# Patient Record
Sex: FEMALE | Race: WHITE | Employment: OTHER | URBAN - NONMETROPOLITAN AREA
[De-identification: names, ages, dates, MRNs, and addresses within clinical notes are randomized per-mention and may not be internally consistent; named-entity substitution may affect disease eponyms.]

---

## 2017-04-26 ENCOUNTER — OFFICE VISIT (OUTPATIENT)
Dept: PULMONOLOGY | Age: 72
End: 2017-04-26

## 2017-04-26 VITALS
HEART RATE: 52 BPM | OXYGEN SATURATION: 97 % | SYSTOLIC BLOOD PRESSURE: 120 MMHG | HEIGHT: 65 IN | TEMPERATURE: 97.4 F | WEIGHT: 168.2 LBS | DIASTOLIC BLOOD PRESSURE: 86 MMHG | BODY MASS INDEX: 28.02 KG/M2

## 2017-04-26 DIAGNOSIS — K21.9 GASTROESOPHAGEAL REFLUX DISEASE WITHOUT ESOPHAGITIS: ICD-10-CM

## 2017-04-26 DIAGNOSIS — J47.9 BRONCHIECTASIS WITHOUT COMPLICATION (HCC): Primary | ICD-10-CM

## 2017-04-26 PROCEDURE — G8428 CUR MEDS NOT DOCUMENT: HCPCS | Performed by: INTERNAL MEDICINE

## 2017-04-26 PROCEDURE — 99213 OFFICE O/P EST LOW 20 MIN: CPT | Performed by: INTERNAL MEDICINE

## 2017-04-26 PROCEDURE — 3014F SCREEN MAMMO DOC REV: CPT | Performed by: INTERNAL MEDICINE

## 2017-04-26 PROCEDURE — 1123F ACP DISCUSS/DSCN MKR DOCD: CPT | Performed by: INTERNAL MEDICINE

## 2017-04-26 PROCEDURE — 4040F PNEUMOC VAC/ADMIN/RCVD: CPT | Performed by: INTERNAL MEDICINE

## 2017-04-26 PROCEDURE — 1090F PRES/ABSN URINE INCON ASSESS: CPT | Performed by: INTERNAL MEDICINE

## 2017-04-26 PROCEDURE — 1036F TOBACCO NON-USER: CPT | Performed by: INTERNAL MEDICINE

## 2017-04-26 PROCEDURE — G8420 CALC BMI NORM PARAMETERS: HCPCS | Performed by: INTERNAL MEDICINE

## 2017-04-26 PROCEDURE — 3017F COLORECTAL CA SCREEN DOC REV: CPT | Performed by: INTERNAL MEDICINE

## 2017-04-26 PROCEDURE — G8400 PT W/DXA NO RESULTS DOC: HCPCS | Performed by: INTERNAL MEDICINE

## 2017-04-26 RX ORDER — OMEPRAZOLE 20 MG/1
20 TABLET, DELAYED RELEASE ORAL DAILY
Qty: 90 TABLET | Refills: 3 | Status: SHIPPED | OUTPATIENT
Start: 2017-04-26 | End: 2020-07-02 | Stop reason: ALTCHOICE

## 2017-04-26 ASSESSMENT — ENCOUNTER SYMPTOMS
APNEA: 0
SHORTNESS OF BREATH: 0
WHEEZING: 0
COUGH: 0

## 2017-07-15 ENCOUNTER — HOSPITAL ENCOUNTER (OUTPATIENT)
Age: 72
Discharge: HOME | End: 2017-07-15
Payer: MEDICARE

## 2017-07-15 LAB
ANION GAP SERPL CALCULATED.3IONS-SCNC: 12 MEQ/L (ref 8–16)
BUN BLDV-MCNC: 14 MG/DL (ref 7–22)
CALCIUM SERPL-MCNC: 9.2 MG/DL (ref 8.5–10.5)
CHLORIDE BLD-SCNC: 104 MEQ/L (ref 98–111)
CO2: 24 MEQ/L (ref 23–33)
CREAT SERPL-MCNC: 0.7 MG/DL (ref 0.4–1.2)
EKG ATRIAL RATE: 54 BPM
EKG P AXIS: 36 DEGREES
EKG P-R INTERVAL: 168 MS
EKG Q-T INTERVAL: 472 MS
EKG QRS DURATION: 94 MS
EKG QTC CALCULATION (BAZETT): 447 MS
EKG R AXIS: 38 DEGREES
EKG T AXIS: 52 DEGREES
EKG VENTRICULAR RATE: 54 BPM
GFR SERPL CREATININE-BSD FRML MDRD: 82 ML/MIN/1.73M2
GLUCOSE BLD-MCNC: 100 MG/DL (ref 70–108)
HCT VFR BLD CALC: 43.2 % (ref 37–47)
HEMOGLOBIN: 14.2 GM/DL (ref 12–16)
MCH RBC QN AUTO: 29.8 PG (ref 27–31)
MCHC RBC AUTO-ENTMCNC: 32.8 GM/DL (ref 33–37)
MCV RBC AUTO: 90.9 FL (ref 81–99)
PDW BLD-RTO: 15.4 % (ref 11.5–14.5)
PLATELET # BLD: 121 THOU/MM3 (ref 130–400)
PMV BLD AUTO: 7.2 MCM (ref 7.4–10.4)
POTASSIUM SERPL-SCNC: 4.3 MEQ/L (ref 3.5–5.2)
RBC # BLD: 4.75 MILL/MM3 (ref 4.2–5.4)
SODIUM BLD-SCNC: 140 MEQ/L (ref 135–145)
WBC # BLD: 40.6 THOU/MM3 (ref 4.8–10.8)

## 2017-07-15 PROCEDURE — 36415 COLL VENOUS BLD VENIPUNCTURE: CPT

## 2017-07-15 PROCEDURE — 80048 BASIC METABOLIC PNL TOTAL CA: CPT

## 2017-07-15 PROCEDURE — 93005 ELECTROCARDIOGRAM TRACING: CPT

## 2017-07-15 PROCEDURE — 85027 COMPLETE CBC AUTOMATED: CPT

## 2017-07-21 RX ORDER — METOPROLOL TARTRATE 50 MG/1
50 TABLET, FILM COATED ORAL 2 TIMES DAILY
COMMUNITY
Start: 2017-04-13

## 2017-07-21 RX ORDER — AMLODIPINE BESYLATE 10 MG/1
10 TABLET ORAL DAILY
COMMUNITY
Start: 2017-04-13

## 2017-07-21 RX ORDER — FLUTICASONE PROPIONATE 220 UG/1
1 AEROSOL, METERED RESPIRATORY (INHALATION) 2 TIMES DAILY
COMMUNITY
End: 2017-08-14 | Stop reason: SDUPTHER

## 2017-07-28 ENCOUNTER — ANESTHESIA (OUTPATIENT)
Dept: OPERATING ROOM | Age: 72
End: 2017-07-28
Payer: MEDICARE

## 2017-07-28 ENCOUNTER — HOSPITAL ENCOUNTER (OUTPATIENT)
Age: 72
Setting detail: OUTPATIENT SURGERY
Discharge: HOME | End: 2017-07-28
Attending: SPECIALIST | Admitting: SPECIALIST
Payer: MEDICARE

## 2017-07-28 ENCOUNTER — ANESTHESIA EVENT (OUTPATIENT)
Dept: OPERATING ROOM | Age: 72
End: 2017-07-28
Payer: MEDICARE

## 2017-07-28 VITALS
HEART RATE: 60 BPM | OXYGEN SATURATION: 95 % | TEMPERATURE: 97.1 F | RESPIRATION RATE: 16 BRPM | DIASTOLIC BLOOD PRESSURE: 61 MMHG | WEIGHT: 169 LBS | BODY MASS INDEX: 28.16 KG/M2 | HEIGHT: 65 IN | SYSTOLIC BLOOD PRESSURE: 123 MMHG

## 2017-07-28 VITALS — SYSTOLIC BLOOD PRESSURE: 130 MMHG | DIASTOLIC BLOOD PRESSURE: 57 MMHG | OXYGEN SATURATION: 96 %

## 2017-07-28 PROCEDURE — 2580000003 HC RX 258: Performed by: SPECIALIST

## 2017-07-28 PROCEDURE — 3700000000 HC ANESTHESIA ATTENDED CARE: Performed by: SPECIALIST

## 2017-07-28 PROCEDURE — 3600000003 HC SURGERY LEVEL 3 BASE: Performed by: SPECIALIST

## 2017-07-28 PROCEDURE — 3700000001 HC ADD 15 MINUTES (ANESTHESIA): Performed by: SPECIALIST

## 2017-07-28 PROCEDURE — 7100000011 HC PHASE II RECOVERY - ADDTL 15 MIN: Performed by: SPECIALIST

## 2017-07-28 PROCEDURE — 7100000010 HC PHASE II RECOVERY - FIRST 15 MIN: Performed by: SPECIALIST

## 2017-07-28 PROCEDURE — 3600000013 HC SURGERY LEVEL 3 ADDTL 15MIN: Performed by: SPECIALIST

## 2017-07-28 PROCEDURE — 6370000000 HC RX 637 (ALT 250 FOR IP): Performed by: SPECIALIST

## 2017-07-28 PROCEDURE — 6360000002 HC RX W HCPCS: Performed by: SPECIALIST

## 2017-07-28 PROCEDURE — 2500000003 HC RX 250 WO HCPCS: Performed by: SPECIALIST

## 2017-07-28 RX ORDER — PROPOFOL 10 MG/ML
INJECTION, EMULSION INTRAVENOUS PRN
Status: DISCONTINUED | OUTPATIENT
Start: 2017-07-28 | End: 2017-07-28 | Stop reason: SDUPTHER

## 2017-07-28 RX ORDER — GLYCOPYRROLATE 0.2 MG/ML
INJECTION INTRAMUSCULAR; INTRAVENOUS PRN
Status: DISCONTINUED | OUTPATIENT
Start: 2017-07-28 | End: 2017-07-28 | Stop reason: SDUPTHER

## 2017-07-28 RX ORDER — ONDANSETRON 2 MG/ML
INJECTION INTRAMUSCULAR; INTRAVENOUS PRN
Status: DISCONTINUED | OUTPATIENT
Start: 2017-07-28 | End: 2017-07-28 | Stop reason: SDUPTHER

## 2017-07-28 RX ORDER — SODIUM CHLORIDE 9 MG/ML
INJECTION, SOLUTION INTRAVENOUS CONTINUOUS
Status: DISCONTINUED | OUTPATIENT
Start: 2017-07-28 | End: 2017-07-28 | Stop reason: HOSPADM

## 2017-07-28 RX ORDER — GINSENG 100 MG
CAPSULE ORAL PRN
Status: DISCONTINUED | OUTPATIENT
Start: 2017-07-28 | End: 2017-07-28 | Stop reason: HOSPADM

## 2017-07-28 RX ORDER — LIDOCAINE HYDROCHLORIDE AND EPINEPHRINE 10; 10 MG/ML; UG/ML
INJECTION, SOLUTION INFILTRATION; PERINEURAL PRN
Status: DISCONTINUED | OUTPATIENT
Start: 2017-07-28 | End: 2017-07-28 | Stop reason: HOSPADM

## 2017-07-28 RX ORDER — FENTANYL CITRATE 50 UG/ML
INJECTION, SOLUTION INTRAMUSCULAR; INTRAVENOUS PRN
Status: DISCONTINUED | OUTPATIENT
Start: 2017-07-28 | End: 2017-07-28 | Stop reason: SDUPTHER

## 2017-07-28 RX ADMIN — CEFAZOLIN SODIUM 1 G: 1 INJECTION, SOLUTION INTRAVENOUS at 08:12

## 2017-07-28 RX ADMIN — FENTANYL CITRATE 50 MCG: 50 INJECTION INTRAMUSCULAR; INTRAVENOUS at 08:12

## 2017-07-28 RX ADMIN — PROPOFOL 30 MG: 10 INJECTION, EMULSION INTRAVENOUS at 08:50

## 2017-07-28 RX ADMIN — PROPOFOL 20 MG: 10 INJECTION, EMULSION INTRAVENOUS at 09:10

## 2017-07-28 RX ADMIN — ONDANSETRON 4 MG: 2 INJECTION INTRAMUSCULAR; INTRAVENOUS at 08:26

## 2017-07-28 RX ADMIN — PROPOFOL 50 MG: 10 INJECTION, EMULSION INTRAVENOUS at 08:13

## 2017-07-28 RX ADMIN — PROPOFOL 30 MG: 10 INJECTION, EMULSION INTRAVENOUS at 09:00

## 2017-07-28 RX ADMIN — FENTANYL CITRATE 25 MCG: 50 INJECTION INTRAMUSCULAR; INTRAVENOUS at 08:59

## 2017-07-28 RX ADMIN — GLYCOPYRROLATE 0.1 MG: 0.2 INJECTION, SOLUTION INTRAMUSCULAR; INTRAVENOUS at 08:09

## 2017-07-28 RX ADMIN — PROPOFOL 20 MG: 10 INJECTION, EMULSION INTRAVENOUS at 08:30

## 2017-07-28 RX ADMIN — FENTANYL CITRATE 25 MCG: 50 INJECTION INTRAMUSCULAR; INTRAVENOUS at 08:53

## 2017-07-28 RX ADMIN — PROPOFOL 20 MG: 10 INJECTION, EMULSION INTRAVENOUS at 08:23

## 2017-07-28 RX ADMIN — LIDOCAINE HYDROCHLORIDE 50 MG: 20 INJECTION, SOLUTION INTRAVENOUS at 08:13

## 2017-07-28 RX ADMIN — PROPOFOL 30 MG: 10 INJECTION, EMULSION INTRAVENOUS at 08:40

## 2017-07-28 RX ADMIN — SODIUM CHLORIDE: 9 INJECTION, SOLUTION INTRAVENOUS at 08:09

## 2017-07-28 ASSESSMENT — PULMONARY FUNCTION TESTS
PIF_VALUE: 0

## 2017-07-28 ASSESSMENT — PAIN - FUNCTIONAL ASSESSMENT: PAIN_FUNCTIONAL_ASSESSMENT: 0-10

## 2017-07-28 ASSESSMENT — COPD QUESTIONNAIRES: CAT_SEVERITY: NO INTERVAL CHANGE

## 2017-07-28 ASSESSMENT — PAIN SCALES - GENERAL: PAINLEVEL_OUTOF10: 0

## 2017-08-14 RX ORDER — FLUTICASONE PROPIONATE 220 UG/1
1 AEROSOL, METERED RESPIRATORY (INHALATION) 2 TIMES DAILY
Qty: 3 INHALER | Refills: 3 | Status: SHIPPED | OUTPATIENT
Start: 2017-08-14 | End: 2022-10-18

## 2017-10-06 ENCOUNTER — HOSPITAL ENCOUNTER (OUTPATIENT)
Age: 72
Setting detail: OUTPATIENT SURGERY
Discharge: HOME | End: 2017-10-06
Attending: SPECIALIST | Admitting: SPECIALIST
Payer: MEDICARE

## 2017-10-06 ENCOUNTER — ANESTHESIA (OUTPATIENT)
Dept: OPERATING ROOM | Age: 72
End: 2017-10-06
Payer: MEDICARE

## 2017-10-06 ENCOUNTER — ANESTHESIA EVENT (OUTPATIENT)
Dept: OPERATING ROOM | Age: 72
End: 2017-10-06
Payer: MEDICARE

## 2017-10-06 VITALS
OXYGEN SATURATION: 95 % | HEART RATE: 58 BPM | RESPIRATION RATE: 13 BRPM | HEIGHT: 65 IN | BODY MASS INDEX: 27.99 KG/M2 | DIASTOLIC BLOOD PRESSURE: 57 MMHG | SYSTOLIC BLOOD PRESSURE: 130 MMHG | TEMPERATURE: 97.1 F | WEIGHT: 168 LBS

## 2017-10-06 VITALS — SYSTOLIC BLOOD PRESSURE: 120 MMHG | OXYGEN SATURATION: 96 % | DIASTOLIC BLOOD PRESSURE: 50 MMHG

## 2017-10-06 PROCEDURE — 7100000011 HC PHASE II RECOVERY - ADDTL 15 MIN: Performed by: SPECIALIST

## 2017-10-06 PROCEDURE — 6360000002 HC RX W HCPCS: Performed by: SPECIALIST

## 2017-10-06 PROCEDURE — 2500000003 HC RX 250 WO HCPCS: Performed by: NURSE ANESTHETIST, CERTIFIED REGISTERED

## 2017-10-06 PROCEDURE — 3600000012 HC SURGERY LEVEL 2 ADDTL 15MIN: Performed by: SPECIALIST

## 2017-10-06 PROCEDURE — 2580000003 HC RX 258: Performed by: SPECIALIST

## 2017-10-06 PROCEDURE — 6360000002 HC RX W HCPCS: Performed by: NURSE ANESTHETIST, CERTIFIED REGISTERED

## 2017-10-06 PROCEDURE — 2500000003 HC RX 250 WO HCPCS: Performed by: SPECIALIST

## 2017-10-06 PROCEDURE — 3600000002 HC SURGERY LEVEL 2 BASE: Performed by: SPECIALIST

## 2017-10-06 PROCEDURE — 3700000001 HC ADD 15 MINUTES (ANESTHESIA): Performed by: SPECIALIST

## 2017-10-06 PROCEDURE — 7100000010 HC PHASE II RECOVERY - FIRST 15 MIN: Performed by: SPECIALIST

## 2017-10-06 PROCEDURE — 3700000000 HC ANESTHESIA ATTENDED CARE: Performed by: SPECIALIST

## 2017-10-06 RX ORDER — FENTANYL CITRATE 50 UG/ML
INJECTION, SOLUTION INTRAMUSCULAR; INTRAVENOUS PRN
Status: DISCONTINUED | OUTPATIENT
Start: 2017-10-06 | End: 2017-10-06 | Stop reason: SDUPTHER

## 2017-10-06 RX ORDER — SODIUM CHLORIDE 9 MG/ML
INJECTION, SOLUTION INTRAVENOUS CONTINUOUS
Status: DISCONTINUED | OUTPATIENT
Start: 2017-10-06 | End: 2017-10-06 | Stop reason: HOSPADM

## 2017-10-06 RX ORDER — PROPOFOL 10 MG/ML
INJECTION, EMULSION INTRAVENOUS PRN
Status: DISCONTINUED | OUTPATIENT
Start: 2017-10-06 | End: 2017-10-06 | Stop reason: SDUPTHER

## 2017-10-06 RX ORDER — ONDANSETRON 2 MG/ML
INJECTION INTRAMUSCULAR; INTRAVENOUS PRN
Status: DISCONTINUED | OUTPATIENT
Start: 2017-10-06 | End: 2017-10-06 | Stop reason: SDUPTHER

## 2017-10-06 RX ORDER — LIDOCAINE HYDROCHLORIDE AND EPINEPHRINE 10; 10 MG/ML; UG/ML
INJECTION, SOLUTION INFILTRATION; PERINEURAL PRN
Status: DISCONTINUED | OUTPATIENT
Start: 2017-10-06 | End: 2017-10-06 | Stop reason: HOSPADM

## 2017-10-06 RX ORDER — LIDOCAINE HYDROCHLORIDE 20 MG/ML
INJECTION, SOLUTION INFILTRATION; PERINEURAL PRN
Status: DISCONTINUED | OUTPATIENT
Start: 2017-10-06 | End: 2017-10-06 | Stop reason: SDUPTHER

## 2017-10-06 RX ADMIN — PROPOFOL 20 MG: 10 INJECTION, EMULSION INTRAVENOUS at 10:23

## 2017-10-06 RX ADMIN — PROPOFOL 30 MG: 10 INJECTION, EMULSION INTRAVENOUS at 10:15

## 2017-10-06 RX ADMIN — ONDANSETRON 4 MG: 2 INJECTION INTRAMUSCULAR; INTRAVENOUS at 10:28

## 2017-10-06 RX ADMIN — CEFAZOLIN SODIUM 1 G: 1 INJECTION, SOLUTION INTRAVENOUS at 10:16

## 2017-10-06 RX ADMIN — PROPOFOL 20 MG: 10 INJECTION, EMULSION INTRAVENOUS at 10:25

## 2017-10-06 RX ADMIN — FENTANYL CITRATE 25 MCG: 50 INJECTION INTRAMUSCULAR; INTRAVENOUS at 10:15

## 2017-10-06 RX ADMIN — SODIUM CHLORIDE: 9 INJECTION, SOLUTION INTRAVENOUS at 10:14

## 2017-10-06 RX ADMIN — LIDOCAINE HYDROCHLORIDE 40 MG: 20 INJECTION, SOLUTION INFILTRATION; PERINEURAL at 10:15

## 2017-10-06 RX ADMIN — FENTANYL CITRATE 25 MCG: 50 INJECTION INTRAMUSCULAR; INTRAVENOUS at 10:30

## 2017-10-06 RX ADMIN — PROPOFOL 20 MG: 10 INJECTION, EMULSION INTRAVENOUS at 10:19

## 2017-10-06 ASSESSMENT — PULMONARY FUNCTION TESTS
PIF_VALUE: 0
PIF_VALUE: 1
PIF_VALUE: 0

## 2017-10-06 ASSESSMENT — PAIN - FUNCTIONAL ASSESSMENT: PAIN_FUNCTIONAL_ASSESSMENT: 0-10

## 2017-10-06 NOTE — ANESTHESIA PRE PROCEDURE
lymphocytic leukemia) (HCC) C91.10    Hypertrophy of nasal turbinates J34.3    Post-nasal drainage R09.82    Chronic rhinitis J31.0    Deviated nasal septum J34.2    Maxillary antritis J32.0    Ethmoid sinusitis J32.2    GERD (gastroesophageal reflux disease) K21.9    Acute sinusitis J01.90    Immune deficiency disorder (HCC) D84.9       Past Medical History:        Diagnosis Date    Chronic bronchitis (HCC)     Chronic lymphocytic leukemia (HCC)     Essential hypertension     GERD (gastroesophageal reflux disease)     Pneumonia     Thyroid disease     Vein, varicose        Past Surgical History:        Procedure Laterality Date    COLONOSCOPY      x3    EYE SURGERY      Eye lid.     EYE SURGERY Bilateral 07/2016    Cataracts    HAND SURGERY      burnt off mole on hand for precancerous    MANDIBLE SURGERY      burnt off mole for precancerous     MOHS SURGERY Left 07/28/2017    repair SCC of the left lateral brow    MOHS SURGERY  10/05/2017    Nose    MA DELAY/SECTN FLAP LID,NOS,EAR,LIP Left 7/28/2017    MOHS REPAIR SCC OF THE LEFT LATERAL BROW performed by Rodrick De Anda MD at [address] [place] [address]  4/20/15    face       Social History:    Social History   Substance Use Topics    Smoking status: Never Smoker    Smokeless tobacco: Never Used    Alcohol use Yes      Comment: occasionally                                Counseling given: Not Answered      Vital Signs (Current):   Vitals:    10/02/17 0903 10/06/17 0931   BP:  (!) 153/61   Pulse:  54   Resp:  16   Temp:  97.9 °F (36.6 °C)   TempSrc:  Temporal   SpO2:  96%   Weight: 160 lb (72.6 kg) 168 lb (76.2 kg)   Height: 5' 5\" (1.651 m) 5' 5\" (1.651 m)                                              BP Readings from Last 3 Encounters:   10/06/17 (!) 153/61   07/28/17 123/61   07/28/17 (!) 130/57       NPO Status: Time of last liquid consumption: 2330                        Time of last solid consumption: 2100 Date of last liquid consumption: 10/05/17                        Date of last solid food consumption: 10/05/17    BMI:   Wt Readings from Last 3 Encounters:   10/06/17 168 lb (76.2 kg)   07/28/17 169 lb (76.7 kg)   04/26/17 168 lb 3.2 oz (76.3 kg)     Body mass index is 27.96 kg/(m^2). CBC:   Lab Results   Component Value Date    WBC 40.6 07/15/2017    RBC 4.75 07/15/2017    HGB 14.2 07/15/2017    HCT 43.2 07/15/2017    MCV 90.9 07/15/2017    RDW 15.4 07/15/2017     07/15/2017       CMP:   Lab Results   Component Value Date     07/15/2017    K 4.3 07/15/2017     07/15/2017    CO2 24 07/15/2017    BUN 14 07/15/2017    CREATININE 0.7 07/15/2017    LABGLOM 82 07/15/2017    GLUCOSE 100 07/15/2017    CALCIUM 9.2 07/15/2017       POC Tests: No results for input(s): POCGLU, POCNA, POCK, POCCL, POCBUN, POCHEMO, POCHCT in the last 72 hours. Coags: No results found for: PROTIME, INR, APTT    HCG (If Applicable): No results found for: PREGTESTUR, PREGSERUM, HCG, HCGQUANT     ABGs: No results found for: PHART, PO2ART, OSD1REC, IOA1AXT, BEART, W3NUFGPT     Type & Screen (If Applicable):  No results found for: LABABO, LABRH    Anesthesia Evaluation    Airway: Mallampati: II  TM distance: >3 FB   Neck ROM: full  Mouth opening: > = 3 FB Dental:          Pulmonary: breath sounds clear to auscultation         Cardiovascular:    (+) hypertension:,         Rhythm: regular                   Neuro/Psych:      GI/Hepatic/Renal:   (+) GERD:,         Endo/Other:          Abdominal:                    Anesthesia Plan    ASA 3     MAC   (Possible GA)  intravenous induction   Anesthetic plan and risks discussed with patient. Plan discussed with CRNA.             Merrick Charles MD   10/6/2017

## 2017-10-06 NOTE — IP AVS SNAPSHOT
Patient Information     Patient Name MAEVE Wilhelm 1945      SEDATION/ANALGESIA INFORMATION/HOME GOING ADVICE     SEDATION / ANALGESIA INFORMATION / Fernando Garcia 83 have received the sedation/analgesia medication during your visit    Sedation/analgesia is used during short medical procedures under controlled supervision. The medication will produce a strong relaxation. You will be able to hear, speak and follow instructions, but your memory and alertness will be decreased. You will be able to swallow and breathe on your own. During sedation/analgesia your blood pressure, heart and breathing will be watched closely. After the procedure, you may not remember what was said or done. You may have the following effects from the medication. \" Drowsiness, dizziness, sleepiness or confusion. \" Difficulty remembering or delayed reaction times. \" Loss of fine muscle control or difficulty with your balance especially while walking. \" Difficulty focusing or blurred vision. You may not be aware of slight changes in your behavior and/or your reaction time because of the medication used during the procedure. Therefore you should follow these instructions. \" Have someone responsible help you with your care. \" Do not drive for 24 hours. \" Do not operate equipment for 24 hours (lawnmowers, power tools, kitchen accessories, stove). \" Do not drink any alcoholic beverages for a minimum of 24 hours. \" Do not make important personal, legal or business decisions for 24 hours. \" You may experience dizziness or lightheadedness. Move slowly and carefully, do not make sudden position changes. \" Drink extra amounts of fluids today. \" Increase your diet as tolerated (unless you have received specific instructions from your doctor). \" If you feel nauseated, continue with liquids until the nausea is gone. \" Notify your physician if you have not urinated within 8 hours after the procedure.

## 2017-10-06 NOTE — H&P
[address] [address]  History and Physical Update    Pt Name: Sherrlyn Cushing  MRN: [id number]  YOB: 1945  Date of evaluation: 10/6/2017    I have examined the patient and reviewed the H&P/Consult and there are no changes to the patient or plans.       Alessandro Varghese  Electronically signed 10/6/2017 at 7:28 AM

## 2017-10-06 NOTE — IP AVS SNAPSHOT
Pneumococcal 13-valent Conjugate Victor Hugo Lagos) 10/15/2015 -- -- --    External: Patient reported      Last Vitals          Most Recent Value    Temp  97.1 °F (36.2 °C)    Pulse  58    Resp  13    BP  (!)  130/57         After Visit Summary    This summary was created for you. Thank you for entrusting your care to us. The following information includes details about your hospital/visit stay along with steps you should take to help with your recovery once you leave the hospital.  In this packet, you will find information about the topics listed below:    · Instructions about your medications including a list of your home medications  · A summary of your hospital visit  · Follow-up appointments once you have left the hospital  · Your care plan at home      You may receive a survey regarding the care you received during your stay. Your input is valuable to us. We encourage you to complete and return your survey in the envelope provided. We hope you will choose us in the future for your healthcare needs. Patient Information     Patient Name MAEVE Raza 1945      Care Provided at:     Name Address Phone       [address] [phone]            Your Visit    Here you will find information about your visit, including the reason for your visit. Please take this sheet with you when you visit your doctor or other health care provider in the future. It will help determine the best possible medical care for you at that time. If you have any questions once you leave the hospital, please call the department phone number listed below. Why you were here     Your primary diagnosis was:  Not on File      Visit Information     Date & Time Provider Department Dept.  Phone    10/6/2017 MD Roma Thomas [phone]       Follow-up Appointments Care Plan Once You Return Home    This section includes instructions you will need to follow once you leave the hospital.  Your care team will discuss these with you, so you and those caring for you know how to best care for your health needs at home. This section may also include educational information about certain health topics that may be of help to you. Discharge Instructions       POST OPERATIVE INSTRUCTION SHEET  SKIN TUMOR/LESION REMOVAL          Activity:    · No strenuous activity for 48 hours  · No activity that stresses the suture closure/incision  · Regular diet; Unless operation of lip- then clear liquids for 48 hours (Sip from a cup: do not use a straw)  · ABSOLUTELY NO NICOTINE OF ANY TYPE    Wound Care:  · If Dermabond used, you may shower 24 hours post operation, but do not scrub, rub or pick at adhesive glue  · Keep all incisions clean  · You may shower 36 hours after the operation    Limitations:  · No swimming, hot tub, sauna or soaking in a bathtub    Prescriptions:  · Take exactly as prescribed    Follow-Up:  · Call office for an appointment in 3-4 weeks, unless otherwise instructed by Dr. Pam Duval our office if you experience any of the following:   Develop a fever (temperature is greater than 100.5F)   Develop redness greater than 1 cm around incision or red streaks up         extremity   Have any excess bleeding/ increased drainage or swelling at the             incision site    *Note:  Your pathology results will be reviewed with you at your scheduled follow-up appointment. [employer] Signup     Our records indicate that you have an active [employer] account. You can view your After Visit Summary by going to [email or web address] and logging in with your [employer] username and password.       If you don't have a [employer] username and password but a parent or guardian has access to your record, the parent or guardian should login

## 2020-07-02 ENCOUNTER — HOSPITAL ENCOUNTER (OUTPATIENT)
Dept: WOUND CARE | Age: 75
Discharge: HOME | End: 2020-07-02
Payer: MEDICARE

## 2020-07-02 VITALS
DIASTOLIC BLOOD PRESSURE: 69 MMHG | OXYGEN SATURATION: 96 % | HEIGHT: 65 IN | WEIGHT: 140 LBS | BODY MASS INDEX: 23.32 KG/M2 | SYSTOLIC BLOOD PRESSURE: 159 MMHG | TEMPERATURE: 97.4 F | RESPIRATION RATE: 16 BRPM | HEART RATE: 49 BPM

## 2020-07-02 PROBLEM — Z85.828 HISTORY OF MOHS SURGERY FOR SQUAMOUS CELL CARCINOMA OF SKIN: Status: ACTIVE | Noted: 2020-07-02

## 2020-07-02 PROBLEM — L98.499 SKIN ULCER DUE TO RADIATION EXPOSURE (HCC): Status: ACTIVE | Noted: 2020-07-02

## 2020-07-02 PROBLEM — Z98.890 HISTORY OF MOHS SURGERY FOR SQUAMOUS CELL CARCINOMA OF SKIN: Status: ACTIVE | Noted: 2020-07-02

## 2020-07-02 PROBLEM — Z85.828 PERSONAL HISTORY OF SQUAMOUS CELL CARCINOMA OF SKIN: Status: ACTIVE | Noted: 2020-07-02

## 2020-07-02 PROCEDURE — 99203 OFFICE O/P NEW LOW 30 MIN: CPT | Performed by: NURSE PRACTITIONER

## 2020-07-02 PROCEDURE — 99213 OFFICE O/P EST LOW 20 MIN: CPT

## 2020-07-02 RX ORDER — ALLOPURINOL 300 MG/1
300 TABLET ORAL DAILY
COMMUNITY
End: 2022-10-18

## 2020-07-02 RX ORDER — CLONIDINE HYDROCHLORIDE 0.1 MG/1
0.1 TABLET ORAL NIGHTLY
COMMUNITY

## 2020-07-02 RX ORDER — FUROSEMIDE 20 MG/1
20 TABLET ORAL 2 TIMES DAILY
COMMUNITY
End: 2022-10-18 | Stop reason: DRUGHIGH

## 2020-07-02 RX ORDER — IBRUTINIB 420 MG/1
420 TABLET, FILM COATED ORAL DAILY
COMMUNITY

## 2020-07-02 RX ORDER — FLUTICASONE PROPIONATE 50 MCG
1 SPRAY, SUSPENSION (ML) NASAL DAILY
COMMUNITY
End: 2022-10-18

## 2020-07-02 NOTE — PROGRESS NOTES
[address] [place] [address] and Procedure Note      Chad INGRAM [facility]  MEDICAL RECORD NUMBER:  [id number]  AGE: 66 y.o. GENDER: female  : 1945  EPISODE DATE:  2020    SUBJECTIVE:     Chief Complaint   Patient presents with    New Patient    Wound Check     SCALP         HISTORY OF PRESENT ILLNESS      Ritchie De La Garza is a 66 y.o. female who presents today for wound/ulcer evaluation. Chelsey Krishna is seen today for progressively worsening wound to left temporal area. Chelsey Krishna has a history of CLL for which she is undergoing chemotherapy with Dr. Rosamaria Chavez. 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 Zi Colindres in 2017. She has received radiation x2 to this area. Chad Abdullahi reports that Dr. Catalina Mckay has been following lesion to left temporal region and recently performed biopsy. Chad Abdullahi states that biopsy showed re-occurrence of cancerous tissue but those results are not available for review. MRI was completed on 6/3/2020 the results of which showed high suspicion for recurrence/spread of tumor. Chelsey Krishna states that follow up with Dr. Rosamaria Chavez is scheduled to determine plan as to treatment options. Chelsey Krishna states that wound to her left temporal region has been present for some time but has been increasing in size and drainage over the last month. She has been applying antibiotic ointment to the area and covering with a band-aid or clean gauze on a daily basis. She denies any foul smell to area. Denies fever, chills, nausea, shortness of breath, chest pain.       PAST MEDICAL HISTORY             Diagnosis Date    Chronic bronchitis (HCC)     Chronic lymphocytic leukemia (HCC)     CLL (chronic lymphocytic leukemia) (HCC)     Essential hypertension     GERD (gastroesophageal reflux disease)     Pneumonia     Squamous cell carcinoma of face     Thyroid disease     Vein, varicose        PAST SURGICAL HISTORY     Past Surgical History:   Procedure Laterality Date    CATARACT REMOVAL      COLONOSCOPY      x3    EYE LID SURGERY      EYE SURGERY      Eye lid.     EYE SURGERY Bilateral 2016    Cataracts    HAND SURGERY      burnt off mole on hand for precancerous    MANDIBLE SURGERY      burnt off mole for precancerous     MOHS SURGERY Left 2017    repair SCC of the left lateral brow    MOHS SURGERY  10/05/2017    Nose    MOHS SURGERY Right 10/06/2017    MOHS Defect Repair BCC Right Distal Nose     ND DELAY/SECTN FLAP LID,NOS,EAR,LIP Left 2017    MOHS REPAIR SCC OF THE LEFT LATERAL BROW performed by Beryl Ingram MD at [address] Right 10/6/2017    MOHS DEFECT REPAIR BCC RIGHT DISTAL NOSE performed by Beryl Ingram MD at [facility] SKIN BIOPSY  4/20/15    face       FAMILY HISTORY     Family History   Problem Relation Age of Onset    Emphysema Father     Heart Attack Father     Allergies Mother     Cancer Mother          of colon cancer at 80years old   [facility] Cancer Sister          of breast cancer age 48years old       SOCIAL HISTORY     Social History     Tobacco Use    Smoking status: Never Smoker    Smokeless tobacco: Never Used   Substance Use Topics    Alcohol use: Yes     Comment: occasionally    Drug use: No       ALLERGIES     Allergies   Allergen Reactions    Seasonal        MEDICATIONS     Current Outpatient Medications on File Prior to Encounter   Medication Sig Dispense Refill    furosemide (LASIX) 20 MG tablet Take 20 mg by mouth 2 times daily      allopurinol (ZYLOPRIM) 300 MG tablet Take 300 mg by mouth daily      ibrutinib (IMBRUVICA) 420 MG tablet Take 420 mg by mouth daily      cloNIDine (CATAPRES) 0.1 MG tablet Take 0.1 mg by mouth 2 times daily      Polyethylene Glycol 400 (BLINK TEARS OP) Apply to eye      fluticasone (FLONASE) 50 MCG/ACT nasal spray 1 spray by Each Nostril route daily      carboxymethylcellulose 1 % ophthalmic solution 1 drop 3 times daily      cemiplimab-rwlc (LIBTAYO) 350 MG/7ML SOLN chemo injection Infuse 350 mg intravenously every 21 days      fluticasone (FLOVENT HFA) 220 MCG/ACT inhaler Inhale 1 puff into the lungs 2 times daily 3 Inhaler 3    amLODIPine (NORVASC) 10 MG tablet Take 10 mg by mouth daily       metoprolol tartrate (LOPRESSOR) 50 MG tablet Take 50 mg by mouth 2 times daily       levothyroxine (SYNTHROID) 50 MCG tablet Take 0.5 mcg by mouth Daily.  Multiple Vitamins-Minerals (CENTRUM ULTRA WOMENS PO) Take 1 tablet by mouth daily. No current facility-administered medications on file prior to encounter. REVIEW OF SYSTEMS:     Constitutional: Denies fever, chills, night sweats, fatigue, weight loss/gain, loss of appetite   Head: Denies headache, head injury  Eye: Denies visual changes  Ears: Denies hearing loss, tinnitus  Mouth/throat: Denies ulceration, dental caries , dysphagia  Respiratory: Denies shortness of breath, cough, wheezing   Cardiovascular:Denies chest pain, palpitations, edema  Gastrointestinal: Denies nausea, vomiting, constipation, diarrhea, abdominal pain   Musculoskeletal: Denies joint pain, swelling , stiffness,  Endocrine: Denies polyuria, polydipsia, cold or heat intolerance  Hematology: Denies easy brusing or bleeding, hx of clotting disorder  Dermatology: +wound left temporal region Denies skin rash, eczema, pruritis  Psychiatry: Denies depression, anxiety, suicidal ideation    PHYSICAL EXAM:     BP (!) 159/69   Pulse (!) 49   Temp 97.4 °F (36.3 °C) (Tympanic)   Resp 16   Ht 5' 5\" (1.651 m)   Wt 140 lb (63.5 kg)   SpO2 96%   BMI 23.30 kg/m²   Wt Readings from Last 3 Encounters:   07/02/20 140 lb (63.5 kg)   10/06/17 168 lb (76.2 kg)   07/28/17 169 lb (76.7 kg)       General:  Awake, alert, no apparent distress. Appears stated age  [de-identified]: conjuctivae are clear without exudate or hemorrhage, anicteric sclera, moist oral mucosa.   Chest: Respirations regular, non-labored. Chest rise and fall equal bilaterally. Lungs clear to auscultation throughout all fields  Cardiovascular:  RRR,S1S2, no murmur or gallop. Abdomen:  Soft, non tender to palpation. Extremities: non-traumatic in appearance. Skin:  Warm and dry. Ulcerous lesion to left temporal region. Wound bed pink, moist with moderate amount of serosanguinous drainage present. Non tender to palpation. Desiree-wound pink, dry, thickening of skin related to radiation history to temporal area. Neurological: Awake, alert, oriented x4. Left sided facial droop-chronic per patient report related to facial nerve damage s/p radiation/surgery  Psychiatric:  Appropriate mood and affect    Wound 07/02/20 Face Left;Upper 1 (Active)   Wound Image   7/2/2020 10:03 AM   Wound Other 7/2/2020 10:03 AM   Offloading for Diabetic Foot Ulcers No offloading required 7/2/2020 10:03 AM   Dressing Status Changed; Intact; Old drainage 7/2/2020 10:03 AM   Dressing Changed Changed/New 7/2/2020 10:03 AM   Wound Cleansed Rinsed/Irrigated with saline 7/2/2020 10:03 AM   Wound Length (cm) 2.9 cm 7/2/2020 10:03 AM   Wound Width (cm) 2 cm 7/2/2020 10:03 AM   Wound Depth (cm) 0.3 cm 7/2/2020 10:03 AM   Wound Surface Area (cm^2) 5.8 cm^2 7/2/2020 10:03 AM   Wound Volume (cm^3) 1.74 cm^3 7/2/2020 10:03 AM   Wound Assessment Yellow;Pink 7/2/2020 10:03 AM   Drainage Amount Moderate 7/2/2020 10:03 AM   Drainage Description Serosanguinous 7/2/2020 10:03 AM   Odor None 7/2/2020 10:03 AM   Margins Attached edges 7/2/2020 10:03 AM   Desiree-wound Assessment Dry 7/2/2020 10:03 AM   Wittmann%Wound Bed 90 7/2/2020 10:03 AM   Yellow%Wound Bed 10 7/2/2020 10:03 AM   Number of days: 0         LABS     No results found for: BC    ASSESSMENT   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 Results unavailable for confirmation at this time.   History of Mohs surgery in 2017 per Dr. Hoover Dakins for similar lesion in same area as reported by patient. 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 Healing will be difficult related to radiation history, surgical history, ongoing chemotherapy and possible reoccurrence of cancerous tissue as reported in biopsy and MRI. Discussed treatment goal of infection prevention, controlling drainage, and limiting the increase in size that she has experienced over the last month. Further treatment for cancerous process per Dr. Madeline Mayer. Faviola Jazz verbalized understanding of goals of treatment and was agreeable to treatment plan as below. Left temporal wound- Apply silver alginate to wound. Cover with silicone bordered foam. Change three times weekly. Discontinue use of antibiotic ointment. Ok to wash hair three times a week with dressing changes. Leave old dressing on while showering. When finished proceed with dressing change as above. Keep wound clean and dry. Antibiotics: No    Follow up 3-4 weeks   Call with any questions or concerns prior to next visit. All questions and concerns addressed prior to discharge from today's visit. Please see attached Discharge Instructions    Written patient dismissal instructions given to patient and signed by patient or POA.

## 2020-07-02 NOTE — PLAN OF CARE
Problem: Wound:  Goal: Will show signs of wound healing; wound closure and no evidence of infection  Description: Will show signs of wound healing; wound closure and no evidence of infection  Outcome: Ongoing     Patient presents to wound clinic for left face wound. No s/s of infection noted. See AVS for discharge instructions. Follow up visit: 3 weeks on Thursday July 23rd at 9:30 am     Care plan reviewed with patient. Patient verbalizes understanding of the plan of care and contribute to goal setting.

## 2020-07-23 ENCOUNTER — HOSPITAL ENCOUNTER (OUTPATIENT)
Dept: WOUND CARE | Age: 75
Discharge: HOME | End: 2020-07-23
Payer: MEDICARE

## 2020-07-23 VITALS
RESPIRATION RATE: 16 BRPM | DIASTOLIC BLOOD PRESSURE: 65 MMHG | TEMPERATURE: 97.1 F | SYSTOLIC BLOOD PRESSURE: 140 MMHG | HEART RATE: 50 BPM | OXYGEN SATURATION: 98 %

## 2020-07-23 PROCEDURE — 99213 OFFICE O/P EST LOW 20 MIN: CPT

## 2020-07-23 PROCEDURE — 99213 OFFICE O/P EST LOW 20 MIN: CPT | Performed by: NURSE PRACTITIONER

## 2020-07-23 RX ORDER — FAMCICLOVIR 500 MG/1
500 TABLET, FILM COATED ORAL 3 TIMES DAILY
COMMUNITY
End: 2022-10-18

## 2020-07-23 NOTE — PLAN OF CARE
Problem: Wound:  Goal: Will show signs of wound healing; wound closure and no evidence of infection  Description: Will show signs of wound healing; wound closure and no evidence of infection  Outcome: Ongoing   Pt. Seen today for scalp wound see AVS for new orders. Follow up in 2 weeks. Care plan reviewed with patient. Patient verbalize understanding of the plan of care and contribute to goal setting.

## 2020-07-23 NOTE — PROGRESS NOTES
[address] [place] [address] and Procedure Note      Asha INGRAM [facility]  MEDICAL RECORD NUMBER:  [id number]  AGE: 66 y.o. GENDER: female  : 1945  EPISODE DATE:  2020    SUBJECTIVE:     Chief Complaint   Patient presents with    Wound Check     left face         HISTORY OF PRESENT ILLNESS      Karol Mireles is a 66 y.o. female who presents today for left temporal wound re-evaluation. Genie Ramirez has a history of CLL for which she is undergoing chemotherapy with Dr. Nora Nye. 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 Mckenzie Ferrell in 2017. She has received radiation x2 to this area. Asha Aleman reports that Dr. Ashu Ramirez has been following lesion to left temporal region and recently performed biopsy. Asha Aleman states that biopsy showed re-occurrence of cancerous tissue but those results are not available for review. MRI was completed on 6/3/2020 the results of which showed high suspicion for recurrence/spread of tumor. Genie Ramirez states that Dr. Nora Nye ordered repeat MRI of area at her last visit. She reports recent occurrence of shingles for which she was treated. She has been using foam dressings as ordered with silver alginate. She reports concerns that alginate has been sticking to the wound, causing wound to dry out. She reports scant drainage from wound. Denies fever, chills, shortness of breath, chest pain. Reports a good appetite. PAST MEDICAL HISTORY             Diagnosis Date    Chronic bronchitis (HCC)     Chronic lymphocytic leukemia (HCC)     CLL (chronic lymphocytic leukemia) (HCC)     Essential hypertension     GERD (gastroesophageal reflux disease)     Pneumonia     Squamous cell carcinoma of face     Thyroid disease     Vein, varicose        PAST SURGICAL HISTORY     Past Surgical History:   Procedure Laterality Date    CATARACT REMOVAL      COLONOSCOPY      x3    EYE LID SURGERY      EYE SURGERY      Eye lid.     EYE SURGERY Bilateral 2016    Cataracts    HAND SURGERY      burnt off mole on hand for precancerous    MANDIBLE SURGERY      burnt off mole for precancerous     MOHS SURGERY Left 2017    repair SCC of the left lateral brow    MOHS SURGERY  10/05/2017    Nose    MOHS SURGERY Right 10/06/2017    MOHS Defect Repair BCC Right Distal Nose     AK DELAY/SECTN FLAP LID,NOS,EAR,LIP Left 2017    MOHS REPAIR SCC OF THE LEFT LATERAL BROW performed by Kathleen Vázquez MD at [facility] Right 10/6/2017    MOHS DEFECT REPAIR BCC RIGHT DISTAL NOSE performed by Kathleen Vázquez MD at [facility] SKIN BIOPSY  4/20/15    face       FAMILY HISTORY     Family History   Problem Relation Age of Onset    Emphysema Father     Heart Attack Father     Allergies Mother     Cancer Mother          of colon cancer at 80years old   [facility] Cancer Sister          of breast cancer age 48years old       SOCIAL HISTORY     Social History     Tobacco Use    Smoking status: Never Smoker    Smokeless tobacco: Never Used   Substance Use Topics    Alcohol use: Yes     Comment: occasionally    Drug use: No       ALLERGIES     Allergies   Allergen Reactions    Seasonal        MEDICATIONS     Current Outpatient Medications on File Prior to Encounter   Medication Sig Dispense Refill    famciclovir (FAMVIR) 500 MG tablet Take 500 mg by mouth 3 times daily      furosemide (LASIX) 20 MG tablet Take 20 mg by mouth 2 times daily      allopurinol (ZYLOPRIM) 300 MG tablet Take 300 mg by mouth daily      ibrutinib (IMBRUVICA) 420 MG tablet Take 420 mg by mouth daily      cloNIDine (CATAPRES) 0.1 MG tablet Take 0.1 mg by mouth 2 times daily      Polyethylene Glycol 400 (BLINK TEARS OP) Apply to eye      fluticasone (FLONASE) 50 MCG/ACT nasal spray 1 spray by Each Nostril route daily      carboxymethylcellulose 1 % ophthalmic solution 1 drop 3 times daily      cemiplimab-rwlc (LIBTAYO) 350 MG/7ML SOLN chemo injection Infuse 350 mg intravenously every 21 days      fluticasone (FLOVENT HFA) 220 MCG/ACT inhaler Inhale 1 puff into the lungs 2 times daily 3 Inhaler 3    amLODIPine (NORVASC) 10 MG tablet Take 10 mg by mouth daily       metoprolol tartrate (LOPRESSOR) 50 MG tablet Take 50 mg by mouth 2 times daily       levothyroxine (SYNTHROID) 50 MCG tablet Take 0.5 mcg by mouth Daily.  Multiple Vitamins-Minerals (CENTRUM ULTRA WOMENS PO) Take 1 tablet by mouth daily. No current facility-administered medications on file prior to encounter. REVIEW OF SYSTEMS:     Constitutional: Denies fever, chills, night sweats, fatigue, weight loss/gain, loss of appetite   Head: Denies headache,  dizziness, loss of consciousness  Eye: Denies visual changes  Ears: Denies hearing loss, tinnitus  Mouth/throat: Denies ulceration, dental caries , dysphagia  Respiratory: Denies shortness of breath, cough, wheezing, dyspnea with exertion  Cardiovascular:Denies chest pain, palpitations, edema  Gastrointestinal: Denies nausea, vomiting, constipation, diarrhea, abdominal pain   MENDOZA: Denies dysuria, frequency, urgency, hematuria  Musculoskeletal: Denies joint pain, swelling , stiffness,  Endocrine: Denies polyuria, polydipsia, cold or heat intolerance  Hematology: Denies easy brusing or bleeding, hx of clotting disorder  Dermatology: +right side shingles rash. + wound left temporal.   Denies skin rash, eczema, pruritis  Psychiatry: Denies depression, anxiety    PHYSICAL EXAM:     BP (!) 140/65   Pulse 50   Temp 97.1 °F (36.2 °C) (Tympanic)   Resp 16   SpO2 98%   Wt Readings from Last 3 Encounters:   07/02/20 140 lb (63.5 kg)   10/06/17 168 lb (76.2 kg)   07/28/17 169 lb (76.7 kg)       General:  Awake, alert, no apparent distress. Appears stated age  [de-identified]: conjuctivae are clear without exudate or hemorrhage, anicteric sclera, moist oral mucosa.   Chest:  Respirations regular, non-labored. Chest rise and fall equal bilaterally. Lungs clear to auscultation throughout all fields  Cardiovascular:  RRR,S1S2, no murmur or gallop. Neurological: Awake, alert, oriented x4   Psychiatric:  Appropriate mood and affect  Extremities: non-traumatic in appearance. Skin:  Warm and dry  Wound:     Location: Left temporal   Classification/stage: malignant wound   Tunneling/undermining: Present:  0.1 cm   Wound bed: healing and red wound base   Exudate:  moderate, serosanguinous   Desiree-wound:dry and sclerotic   Complications[de-identified] uncomplicated   Pain:None   Wound margins intact and healing well. No signs of infection. Wound 07/02/20 Face Left;Upper 1 (Active)   Wound Image   07/23/20 0950   Wound Other 07/23/20 0950   Offloading for Diabetic Foot Ulcers No offloading required 07/02/20 1003   Dressing Status Intact; Old drainage 07/23/20 0950   Dressing Changed Changed/New 07/23/20 0950   Dressing/Treatment Alginate with Ag;Silicone border 71/65/65 1003   Wound Cleansed Rinsed/Irrigated with saline 07/23/20 0950   Wound Length (cm) 2.6 cm 07/23/20 0950   Wound Width (cm) 2.1 cm 07/23/20 0950   Wound Depth (cm) 0.1 cm 07/23/20 0950   Wound Surface Area (cm^2) 5.46 cm^2 07/23/20 0950   Change in Wound Size % (l*w) 5.86 07/23/20 0950   Wound Volume (cm^3) 0.55 cm^3 07/23/20 0950   Wound Healing % 68 07/23/20 0950   Wound Assessment Red 07/23/20 0950   Drainage Amount Moderate 07/23/20 0950   Drainage Description Serosanguinous 07/23/20 0950   Odor None 07/23/20 0950   Margins Attached edges 07/23/20 0950   Desiree-wound Assessment Dry 07/23/20 0950   Pink%Wound Bed 90 07/02/20 1003   Red%Wound Bed 100 07/23/20 0950   Yellow%Wound Bed 10 07/02/20 1003   Number of days: 21         LABS     No results found for: BC    ASSESSMENT   Non-healing ulcer complicated by hx radiation, Moh's procedure  and squamous cell carncinoma       Patient Active Problem List   Diagnosis Code    CLL (chronic lymphocytic leukemia) ([facility]) C91.10    Hypertrophy of nasal turbinates J34.3    Post-nasal drainage R09.82    Chronic rhinitis J31.0    Deviated nasal septum J34.2    Maxillary antritis J32.0    Ethmoid sinusitis J32.2    GERD (gastroesophageal reflux disease) K21.9    Acute sinusitis J01.90    Immune deficiency disorder ([facility]) D84.9    Skin 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 Wound care orders changed as follows:     Apply collagen to wound bed moisten with 1-2 drops of saline. Cover with silicone bordered foam. Change every 3 days. Keep wound clean and dry. Okay to wash hair on dressing change days. Leave foam on while showering. Change dressing after shower. Patient  has been performing dressing changes. Written instructions provided. Joseph Baxter to call with any questions or concerns. Follow up 3 weeks. All questions and concerns addressed prior to discharge from today's visit. Please see attached Discharge Instructions    Written patient dismissal instructions given to patient and signed by patient or POA. Discharge Instructions         Discharge Instructions       Visit Discharge/Physician Orders  - Do not use antibiotic ointment anymore. -  Supplies ordered for you through [employer] Call [phone] to reorder  - Okay to wash hair on dressing change days. Leave foam on while showering. Then change dressing after shower.     Wound Location: Left scalp      Dressing orders:      1) Gather wound care supplies and arrange on clean table.      2) Wash your hands with soap and water or use alcohol based hand  for 20 seconds (sing \"Happy Birthday\" twice).      3) Cleanse wounds with normal saline or wound cleanser and gauze. Pat dry with clean gauze.    4) Left scalp- Apply collagen to wound bed moisten with 1-2 drops of saline. Cover with silicone bordered foam. Change every 3 days     Keep all dressings clean & dry.     Do not shower, take baths or get wound wet, unless otherwise instructed by your Wound Care doctor.      Follow up visit: 3 weeks on Thursday July 23rd at 9:30 am      Keep next scheduled appointment. Please give 24 hour notice if unable to keep appointment. [phone]     If you experience any of the following, please call the Wound Care Service during business hours: Monday through Friday 8:00 am - 4:30 pm  ([phone]).               *Increase in pain              *Temperature over 101              *Increase in drainage from your wound or a foul odor              *Uncontrolled swelling              *Need for compression bandage changes due to slippage, breakthrough drainage     If you need medical attention outside of business hours, please contact your Primary Care Doctor or go to the nearest emergency room.            Electronically signed by SCOOTER Murillo CNP on 7/23/2020 at 10:03 AM

## 2020-08-13 ENCOUNTER — HOSPITAL ENCOUNTER (OUTPATIENT)
Dept: WOUND CARE | Age: 75
Discharge: HOME | End: 2020-08-13
Payer: MEDICARE

## 2020-08-13 VITALS
OXYGEN SATURATION: 97 % | DIASTOLIC BLOOD PRESSURE: 63 MMHG | HEART RATE: 57 BPM | RESPIRATION RATE: 16 BRPM | TEMPERATURE: 95.8 F | SYSTOLIC BLOOD PRESSURE: 135 MMHG

## 2020-08-13 PROCEDURE — 99213 OFFICE O/P EST LOW 20 MIN: CPT | Performed by: NURSE PRACTITIONER

## 2020-08-13 PROCEDURE — 99212 OFFICE O/P EST SF 10 MIN: CPT

## 2020-08-13 NOTE — PROGRESS NOTES
[address] [place] [address] and Procedure Note      Zoe INGRAM [facility]  MEDICAL RECORD NUMBER:  [id number]  AGE: 66 y.o. GENDER: female  : 1945  EPISODE DATE:  2020    SUBJECTIVE:     Chief Complaint   Patient presents with    Wound Check     scalp         HISTORY OF PRESENT ILLNESS      Malinda Dawkins is a 66 y.o. female who presents today for left temporal wound reevaluation. Crystal has history of squamous cell carcinoma to left temporal region for which she had surgery in . She also received 2 rounds of radiation to this area. Wound began late May. Per Tonja's report biopsy showed recurrence of cancerous tissue. Silver alginate was initially used to wound bed but was sticking per 's report at last visit. Dressing was changed to collagen at this time. Zoe Deo reports that this dressing has been working well for her. She reports moderate amount of drainage on dressing with changes. She denies any pain to the area. Denies any fever, chills, decrease in appetite. States that she received her chemotherapy treatment recently but has no further changes in treatment plan per Dr. Kitty Flowers. She does report lump on the left side of her face anterior to her ear. Denies pain, drainage or injury to the area. She denies any further concerns,      PAST MEDICAL HISTORY             Diagnosis Date    Chronic bronchitis (HCC)     Chronic lymphocytic leukemia (HCC)     CLL (chronic lymphocytic leukemia) (HCC)     Essential hypertension     GERD (gastroesophageal reflux disease)     Pneumonia     Squamous cell carcinoma of face     Thyroid disease     Vein, varicose        PAST SURGICAL HISTORY     Past Surgical History:   Procedure Laterality Date    CATARACT REMOVAL      COLONOSCOPY      x3    EYE LID SURGERY      EYE SURGERY      Eye lid.     EYE SURGERY Bilateral 2016    Cataracts    HAND SURGERY      burnt off mole on hand for precancerous    MANDIBLE SURGERY burnt off mole for precancerous     MOHS SURGERY Left 2017    repair SCC of the left lateral brow    MOHS SURGERY  10/05/2017    Nose    MOHS SURGERY Right 10/06/2017    MOHS Defect Repair BCC Right Distal Nose     CO DELAY/SECTN FLAP LID,NOS,EAR,LIP Left 2017    MOHS REPAIR SCC OF THE LEFT LATERAL BROW performed by Lino Dillon MD at [facility] Right 10/6/2017    MOHS DEFECT REPAIR BCC RIGHT DISTAL NOSE performed by Lino Dillon MD at [facility] SKIN BIOPSY  4/20/15    face       FAMILY HISTORY     Family History   Problem Relation Age of Onset    Emphysema Father     Heart Attack Father     Allergies Mother     Cancer Mother          of colon cancer at 80years old   [facility] Cancer Sister          of breast cancer age 48years old       SOCIAL HISTORY     Social History     Tobacco Use    Smoking status: Never Smoker    Smokeless tobacco: Never Used   Substance Use Topics    Alcohol use: Yes     Comment: occasionally    Drug use: No       ALLERGIES     Allergies   Allergen Reactions    Seasonal        MEDICATIONS     Current Outpatient Medications on File Prior to Encounter   Medication Sig Dispense Refill    famciclovir (FAMVIR) 500 MG tablet Take 500 mg by mouth 3 times daily      furosemide (LASIX) 20 MG tablet Take 20 mg by mouth 2 times daily      allopurinol (ZYLOPRIM) 300 MG tablet Take 300 mg by mouth daily      ibrutinib (IMBRUVICA) 420 MG tablet Take 420 mg by mouth daily      cloNIDine (CATAPRES) 0.1 MG tablet Take 0.1 mg by mouth 2 times daily      Polyethylene Glycol 400 (BLINK TEARS OP) Apply to eye      fluticasone (FLONASE) 50 MCG/ACT nasal spray 1 spray by Each Nostril route daily      carboxymethylcellulose 1 % ophthalmic solution 1 drop 3 times daily      cemiplimab-rwlc (LIBTAYO) 350 MG/7ML SOLN chemo injection Infuse 350 mg intravenously every 21 days      fluticasone (FLOVENT HFA) 220 MCG/ACT inhaler Inhale 1 puff into the lungs 2 times daily 3 Inhaler 3    amLODIPine (NORVASC) 10 MG tablet Take 10 mg by mouth daily       metoprolol tartrate (LOPRESSOR) 50 MG tablet Take 50 mg by mouth 2 times daily       levothyroxine (SYNTHROID) 50 MCG tablet Take 0.5 mcg by mouth Daily.  Multiple Vitamins-Minerals (CENTRUM ULTRA WOMENS PO) Take 1 tablet by mouth daily. No current facility-administered medications on file prior to encounter. REVIEW OF SYSTEMS:     Constitutional: Denies fever, chills, night sweats, fatigue, weight loss/gain, loss of appetite   Head: Denies headache,  dizziness, loss of consciousness  Eye: Denies visual changes  Ears: Denies hearing loss, tinnitus  Mouth/throat: Denies ulceration, dental caries , dysphagia  Respiratory: Denies shortness of breath, cough, wheezing, dyspnea with exertion  Cardiovascular:Denies chest pain, palpitations, edema  Gastrointestinal: Denies nausea, vomiting, constipation, diarrhea, abdominal pain   MENDOZA: Denies dysuria, frequency, urgency, hematuria  Musculoskeletal: Denies joint pain, swelling , stiffness,  Endocrine: Denies polyuria, polydipsia, cold or heat intolerance  Hematology: Denies easy brusing or bleeding, hx of clotting disorder  Dermatology: + wound left temporal.   Denies skin rash, eczema, pruritis  Psychiatry: Denies depression, anxiety    PHYSICAL EXAM:     /63   Pulse 57   Temp 95.8 °F (35.4 °C) (Tympanic)   Resp 16   SpO2 97%   Wt Readings from Last 3 Encounters:   07/02/20 140 lb (63.5 kg)   10/06/17 168 lb (76.2 kg)   07/28/17 169 lb (76.7 kg)     General:  Awake, alert, no apparent distress. Appears stated age  [de-identified]: conjuctivae are clear without exudate or hemorrhage, anicteric sclera, moist oral mucosa. + Left-sided preauricular lymphadenopathy  Chest:  Respirations regular, non-labored. Chest rise and fall equal bilaterally.   Lungs clear to auscultation throughout all fields  Cardiovascular: RRR,S1S2, no murmur or gallop. Neurological: Awake, alert, oriented x4   Psychiatric:  Appropriate mood and affect  Extremities: non-traumatic in appearance. Skin:  Warm and dry  Wound:                Location: Left temporal              Classification/stage: malignant wound              Tunneling/undermining: Present:  0.1 cm              Wound bed: healing ,  red wound base              Exudate:  moderate, serosanguinous              Desiree-wound:dry and sclerotic              Complications[de-identified] uncomplicated              Pain:None              Wound margins intact and healing well. No signs of infection. Wound 07/02/20 Face Left;Upper 1 (Active)   Wound Image   08/13/20 0947   Wound Other 08/13/20 0947   Offloading for Diabetic Foot Ulcers No offloading required 07/02/20 1003   Dressing Status Intact; Old drainage 08/13/20 0947   Dressing Changed Changed/New 08/13/20 0947   Dressing/Treatment Alginate with Ag;Silicone border 86/65/13 1003   Wound Cleansed Rinsed/Irrigated with saline 08/13/20 0947   Wound Length (cm) 2.3 cm 08/13/20 0947   Wound Width (cm) 1.8 cm 08/13/20 0947   Wound Depth (cm) 0.1 cm 08/13/20 0947   Wound Surface Area (cm^2) 4.14 cm^2 08/13/20 0947   Change in Wound Size % (l*w) 28.62 08/13/20 0947   Wound Volume (cm^3) 0.41 cm^3 08/13/20 0947   Wound Healing % 76 08/13/20 0947   Wound Assessment Red 08/13/20 0947   Drainage Amount Moderate 08/13/20 0947   Drainage Description Serosanguinous 08/13/20 0947   Odor None 08/13/20 0947   Margins Attached edges 08/13/20 0947   Desiree-wound Assessment Dry 08/13/20 0947   [place]%Wound Bed 90 07/02/20 1003   Red%Wound Bed 100 08/13/20 0947   Yellow%Wound Bed 10 07/02/20 1003   Number of days: 41         LABS     No results found for: BC    ASSESSMENT     -Non-healing ulcer complicated by hx radiation, Moh's procedure  and squamous cell carncinoma  -Left sided preauricular lymphadenopathy     Patient Active Problem List   Diagnosis Code    CLL (chronic scalp      Dressing orders:      1) Gather wound care supplies and arrange on clean table.      2) Wash your hands with soap and water or use alcohol based hand  for 20 seconds (sing \"Happy Birthday\" twice).    3) Cleanse wounds with normal saline or wound cleanser and gauze. Pat dry with clean gauze.    4) Left scalp- Apply collagen to wound bed moisten with 1-2 drops of saline. Cover with silicone bordered foam. Change every 3 days     Keep all dressings clean & dry.     Do not shower, take baths or get wound wet, unless otherwise instructed by your Wound Care doctor.      Follow up visit: 3 weeks on Thursday 9/3/2020 at 9:30 am      Keep next scheduled appointment. Please give 24 hour notice if unable to keep appointment. [phone]     If you experience any of the following, please call the Wound Care Service during business hours: Monday through Friday 8:00 am - 4:30 pm  ([phone]).               *Increase in pain              *Temperature over 101              *Increase in drainage from your wound or a foul odor              *Uncontrolled swelling              *Need for compression bandage changes due to slippage, breakthrough drainage     If you need medical attention outside of business hours, please contact your Primary Care Doctor or go to the nearest emergency room.         Electronically signed by SCOOTER Siegel CNP on 8/13/2020 at 10:02 AM

## 2020-09-10 ENCOUNTER — HOSPITAL ENCOUNTER (OUTPATIENT)
Dept: WOUND CARE | Age: 75
Discharge: HOME | End: 2020-09-10
Payer: MEDICARE

## 2020-09-10 VITALS
OXYGEN SATURATION: 97 % | DIASTOLIC BLOOD PRESSURE: 62 MMHG | HEART RATE: 53 BPM | RESPIRATION RATE: 16 BRPM | SYSTOLIC BLOOD PRESSURE: 136 MMHG

## 2020-09-10 PROCEDURE — 99213 OFFICE O/P EST LOW 20 MIN: CPT

## 2020-09-10 PROCEDURE — 99213 OFFICE O/P EST LOW 20 MIN: CPT | Performed by: NURSE PRACTITIONER

## 2020-09-10 NOTE — PLAN OF CARE
Problem: Wound:  Goal: Will show signs of wound healing; wound closure and no evidence of infection  Description: Will show signs of wound healing; wound closure and no evidence of infection  Outcome: Ongoing     Patient presents to wound clinic for scalp wound. No s/s of infection noted. See AVS for discharge instructions. Follow up visit: 4 weeks on Thursday October 8th at 8:00 am     Care plan reviewed with patient. Patient verbalize understanding of the plan of care and contribute to goal setting.

## 2020-10-08 ENCOUNTER — HOSPITAL ENCOUNTER (OUTPATIENT)
Dept: WOUND CARE | Age: 75
Discharge: HOME | End: 2020-10-08

## 2020-10-09 ENCOUNTER — HOSPITAL ENCOUNTER (OUTPATIENT)
Dept: WOUND CARE | Age: 75
Discharge: HOME | End: 2020-10-09
Payer: MEDICARE

## 2020-10-09 VITALS
SYSTOLIC BLOOD PRESSURE: 135 MMHG | HEART RATE: 53 BPM | TEMPERATURE: 96.9 F | DIASTOLIC BLOOD PRESSURE: 60 MMHG | RESPIRATION RATE: 16 BRPM | OXYGEN SATURATION: 98 %

## 2020-10-09 PROCEDURE — 99212 OFFICE O/P EST SF 10 MIN: CPT

## 2020-10-09 PROCEDURE — 99213 OFFICE O/P EST LOW 20 MIN: CPT | Performed by: NURSE PRACTITIONER

## 2020-10-09 RX ORDER — BETAMETHASONE DIPROPIONATE 0.05 %
OINTMENT (GRAM) TOPICAL ONCE
Status: CANCELLED | OUTPATIENT
Start: 2020-10-09

## 2020-10-09 RX ORDER — LIDOCAINE 40 MG/G
CREAM TOPICAL ONCE
Status: CANCELLED | OUTPATIENT
Start: 2020-10-09

## 2020-10-09 RX ORDER — GENTAMICIN SULFATE 1 MG/G
OINTMENT TOPICAL ONCE
Status: CANCELLED | OUTPATIENT
Start: 2020-10-09

## 2020-10-09 RX ORDER — LIDOCAINE 50 MG/G
OINTMENT TOPICAL ONCE
Status: CANCELLED | OUTPATIENT
Start: 2020-10-09

## 2020-10-09 RX ORDER — LIDOCAINE HYDROCHLORIDE 20 MG/ML
JELLY TOPICAL ONCE
Status: CANCELLED | OUTPATIENT
Start: 2020-10-09

## 2020-10-09 RX ORDER — BACITRACIN ZINC AND POLYMYXIN B SULFATE 500; 1000 [USP'U]/G; [USP'U]/G
OINTMENT TOPICAL ONCE
Status: CANCELLED | OUTPATIENT
Start: 2020-10-09

## 2020-10-09 RX ORDER — BACITRACIN, NEOMYCIN, POLYMYXIN B 400; 3.5; 5 [USP'U]/G; MG/G; [USP'U]/G
OINTMENT TOPICAL ONCE
Status: CANCELLED | OUTPATIENT
Start: 2020-10-09

## 2020-10-09 RX ORDER — LIDOCAINE HYDROCHLORIDE 40 MG/ML
SOLUTION TOPICAL ONCE
Status: CANCELLED | OUTPATIENT
Start: 2020-10-09

## 2020-10-09 RX ORDER — GINSENG 100 MG
CAPSULE ORAL ONCE
Status: CANCELLED | OUTPATIENT
Start: 2020-10-09

## 2020-10-09 RX ORDER — CLOBETASOL PROPIONATE 0.5 MG/G
OINTMENT TOPICAL ONCE
Status: CANCELLED | OUTPATIENT
Start: 2020-10-09

## 2020-10-09 NOTE — PLAN OF CARE
Problem: Wound:  Goal: Will show signs of wound healing; wound closure and no evidence of infection  Description: Will show signs of wound healing; wound closure and no evidence of infection  Outcome: Ongoing   Pt. Seen today for scalp wound see AVS for new orders. Follow up in 4 weeks. Care plan reviewed with patient. Patient verbalize understanding of the plan of care and contribute to goal setting.

## 2020-10-09 NOTE — PROGRESS NOTES
LID,NOS,EAR,LIP Left 2017    MOHS REPAIR SCC OF THE LEFT LATERAL BROW performed by Jacinda Farrell MD at [facility] Right 10/6/2017    MOHS DEFECT REPAIR BCC RIGHT DISTAL NOSE performed by Jacinda Farrell MD at [facility] SKIN BIOPSY  4/20/15    face       FAMILY HISTORY     Family History   Problem Relation Age of Onset    Emphysema Father     Heart Attack Father     Allergies Mother     Cancer Mother          of colon cancer at 80years old   Lindy Stabs Cancer Sister          of breast cancer age 48years old       SOCIAL HISTORY     Social History     Tobacco Use    Smoking status: Never Smoker    Smokeless tobacco: Never Used   Substance Use Topics    Alcohol use: Yes     Comment: occasionally    Drug use: No       ALLERGIES     Allergies   Allergen Reactions    Seasonal        MEDICATIONS     Current Outpatient Medications on File Prior to Encounter   Medication Sig Dispense Refill    famciclovir (FAMVIR) 500 MG tablet Take 500 mg by mouth 3 times daily      furosemide (LASIX) 20 MG tablet Take 20 mg by mouth 2 times daily      allopurinol (ZYLOPRIM) 300 MG tablet Take 300 mg by mouth daily      ibrutinib (IMBRUVICA) 420 MG tablet Take 420 mg by mouth daily      cloNIDine (CATAPRES) 0.1 MG tablet Take 0.1 mg by mouth 2 times daily      Polyethylene Glycol 400 (BLINK TEARS OP) Apply to eye      fluticasone (FLONASE) 50 MCG/ACT nasal spray 1 spray by Each Nostril route daily      carboxymethylcellulose 1 % ophthalmic solution 1 drop 3 times daily      cemiplimab-rwlc (LIBTAYO) 350 MG/7ML SOLN chemo injection Infuse 350 mg intravenously every 21 days      fluticasone (FLOVENT HFA) 220 MCG/ACT inhaler Inhale 1 puff into the lungs 2 times daily 3 Inhaler 3    amLODIPine (NORVASC) 10 MG tablet Take 10 mg by mouth daily       metoprolol tartrate (LOPRESSOR) 50 MG tablet Take 50 mg by mouth 2 times daily       levothyroxine (SYNTHROID) 50 MCG tablet Take 0.5 mcg by mouth Daily.  Multiple Vitamins-Minerals (CENTRUM ULTRA WOMENS PO) Take 1 tablet by mouth daily. No current facility-administered medications on file prior to encounter. REVIEW OF SYSTEMS:     Constitutional: Denies fever, chills, night sweats, fatigue, weight loss/gain, loss of appetite   Head: Denies headache,  dizziness, loss of consciousness  Respiratory: Denies shortness of breath, cough, wheezing, dyspnea with exertion  Cardiovascular:Denies chest pain, palpitations, edema  Gastrointestinal: Denies nausea, vomiting, constipation, diarrhea, abdominal pain   Hematology: Denies easy brusing or bleeding, hx of clotting disorder  Dermatology: + wound left temporal.  Left temporal and pre-auricular mass.   Denies skin rash, eczema, pruritis    PHYSICAL EXAM:     /60   Pulse 53   Temp 96.9 °F (36.1 °C)   Resp 16   SpO2 98%   Wt Readings from Last 3 Encounters:   07/02/20 140 lb (63.5 kg)   10/06/17 168 lb (76.2 kg)   07/28/17 169 lb (76.7 kg)     General:  Awake, alert, no apparent distress.    HEENT: conjuctivae are clear without exudate or hemorrhage, anicteric sclera, moist oral mucosa. + Left-sided preauricular lymphadenopathy. Left sided facial droop  Chest:  Respirations regular, non-labored.  Chest rise and fall equal bilaterally.    Neurological: Awake, alert, oriented x4   Psychiatric:  Appropriate mood and affect  Extremities: non-traumatic in appearance.    Skin:  Warm and dry,   Wound:                Location: Left temporal              Classification/stage: malignant wound              Tunneling/undermining: Not present              Wound bed: red, granulation tissue              Exudate:  moderate, serosanguinous              Desiree-wound:dry and sclerotic              Complications[de-identified] complicated by active cancer, history of radiation, current chemotherapy               Pain:None              Wound margins intact, measuring larger in size.  No signs of infection. Wound 07/02/20 Face Left;Upper 1 (Active)   Wound Image   09/10/20 0816   Wound Etiology Other 08/13/20 0947   Dressing Status Intact; Old drainage noted;New dressing applied 10/09/20 0915   Wound Cleansed Cleansed with saline 10/09/20 0915   Dressing/Treatment Alginate with Ag;Silicone border 72/50/66 0816   Offloading for Diabetic Foot Ulcers No offloading required 10/09/20 0915   Wound Length (cm) 2.7 cm 10/09/20 0915   Wound Width (cm) 3.5 cm 10/09/20 0915   Wound Depth (cm) 0.4 cm 10/09/20 0915   Wound Surface Area (cm^2) 9.45 cm^2 10/09/20 0915   Change in Wound Size % (l*w) -62.93 10/09/20 0915   Wound Volume (cm^3) 3.78 cm^3 10/09/20 0915   Wound Healing % -117 10/09/20 0915   Wound Assessment Bleeding;Granulation tissue 10/09/20 0915   Drainage Amount Moderate 10/09/20 0915   Drainage Description Serosanguinous 10/09/20 0915   Odor None 10/09/20 0915   Desiree-wound Assessment Dry/flaky; Intact 10/09/20 0915   Margins Attached edges 10/09/20 0915   Wound Thickness Description not for Pressure Injury Full thickness 10/09/20 0915   Number of days: 80         LABS     Micro: No results found for: BC    ASSESSMENT     -Non-healing ulcer complicated by hx radiation, Moh's procedure  and squamous cell carncinoma     Patient Active Problem List   Diagnosis Code    CLL (chronic lymphocytic leukemia) ([facility]) C91.10    Hypertrophy of nasal turbinates J34.3    Post-nasal drainage R09.82    Chronic rhinitis J31.0    Deviated nasal septum J34.2    Maxillary antritis J32.0    Ethmoid sinusitis J32.2    GERD (gastroesophageal reflux disease) K21.9    Acute sinusitis J01.90    Immune deficiency disorder ([facility]) D84.9    Skin ulcer due to radiation exposure ([facility] [address] L98.499    Personal history of squamous cell carcinoma of skin Z85.828    History of Mohs surgery for squamous cell carcinoma of skin Z98.890, O24.363       PLAN     Patient examined and evaluated    -Wound is again measuring larger at today's visit. Discussed with Artem Kaur that this wound will be very difficult/slow to heal due to chemotherapy, active cancer, and radiation history. She verbalizes understanding. Drainage has decreased slightly from last visit, serosanguinous. No signs/symptoms of infection reported.     -Dressings continued as follows:  Left scalp- Apply silver alginate to wound. Cover with silicone bordered foam. Change every 3 days     -Okay to wash hair on dressing change days. Leave foam on while showering. Then change dressing after shower.     -Discussed concerns for delayed healing with any procedures. She is unsure of exact procedure or if incision is planned. Advised her to discuss this with Dr. Juanita Monsivais. Antibiotics: No, no signs or symptoms of infection at this time.  Signs and symptoms of infectious process reviewed with Eston Lefort her to call clinic or seek emergency care should these occur.       -Follow up 1 month for re-evaluation. Call with any needs or concerns prior to scheduled visit. All questions and concerns addressed prior to discharge from today's visit. Please see attached Discharge Instructions    Written patient dismissal instructions given to patient and signed by patient or POA. Discharge Instructions     Discharge Instructions       Visit Discharge/Physician Orders  - Do not use antibiotic ointment anymore. - Supplies ordered for you through [facility] Call [phone] to reorder  - Okay to wash hair on dressing change days. Leave foam on while showering. Then change dressing after shower. - eyelid surgery, only concern from wound care is possibility of delayed healing     Wound Location: Left scalp      Dressing orders:      1) Gather wound care supplies and arrange on clean table.      2) Wash your hands with soap and water or use alcohol based hand  for 20 seconds (sing \"Happy Birthday\" twice).      3) Cleanse wounds with normal saline or wound cleanser and gauze. Pat dry with clean gauze.    4) Left scalp- Apply silver alginate to wound. Cover with silicone bordered foam. Change every 3 days     Keep all dressings clean & dry.     Do not shower, take baths or get wound wet, unless otherwise instructed by your Wound Care doctor.      Follow up visit: 4 weeks on 11/6/2020 at 8:30am     Keep next scheduled appointment. Please give 24 hour notice if unable to keep appointment. [phone]     If you experience any of the following, please call the Wound Care Service during business hours: Monday through Friday 8:00 am - 4:30 pm  ([phone]).               *Increase in pain              *Temperature over 101              *Increase in drainage from your wound or a foul odor              *Uncontrolled swelling              *Need for compression bandage changes due to slippage, breakthrough drainage     If you need medical attention outside of business hours, please contact your Primary Care Doctor or go to the nearest emergency room        Electronically signed by SCOOTER Loera CNP on 10/9/2020 at 9:35 AM

## 2020-11-05 ENCOUNTER — TELEPHONE (OUTPATIENT)
Dept: WOUND CARE | Age: 75
End: 2020-11-05

## 2020-11-05 NOTE — TELEPHONE ENCOUNTER
Recent Travel Screening and Travel History documentation:     Travel Screening     Question   Response    In the last month, have you been in contact with someone who was confirmed or suspected to have Coronavirus / COVID-19? No / Unsure    Have you had a COVID-19 viral test in the last 14 days? No    Do you have any of the following new or worsening symptoms? None of these    Have you traveled internationally in the last month? No      Travel History   Travel since 10/05/20     No documented travel since 10/05/20       Telephone call placed to verify pts appointment. Instructed patient to wear a face covering.

## 2020-11-06 ENCOUNTER — HOSPITAL ENCOUNTER (OUTPATIENT)
Dept: WOUND CARE | Age: 75
Discharge: HOME | End: 2020-11-06
Payer: MEDICARE

## 2020-11-06 VITALS
HEIGHT: 65 IN | SYSTOLIC BLOOD PRESSURE: 136 MMHG | WEIGHT: 140 LBS | OXYGEN SATURATION: 98 % | RESPIRATION RATE: 16 BRPM | DIASTOLIC BLOOD PRESSURE: 65 MMHG | TEMPERATURE: 96 F | HEART RATE: 52 BPM | BODY MASS INDEX: 23.32 KG/M2

## 2020-11-06 PROCEDURE — 99213 OFFICE O/P EST LOW 20 MIN: CPT | Performed by: NURSE PRACTITIONER

## 2020-11-06 PROCEDURE — 99212 OFFICE O/P EST SF 10 MIN: CPT

## 2020-11-06 RX ORDER — GINSENG 100 MG
CAPSULE ORAL ONCE
Status: CANCELLED | OUTPATIENT
Start: 2020-11-06

## 2020-11-06 RX ORDER — LIDOCAINE 40 MG/G
CREAM TOPICAL ONCE
Status: CANCELLED | OUTPATIENT
Start: 2020-11-06

## 2020-11-06 RX ORDER — BETAMETHASONE DIPROPIONATE 0.05 %
OINTMENT (GRAM) TOPICAL ONCE
Status: CANCELLED | OUTPATIENT
Start: 2020-11-06

## 2020-11-06 RX ORDER — CLOBETASOL PROPIONATE 0.5 MG/G
OINTMENT TOPICAL ONCE
Status: CANCELLED | OUTPATIENT
Start: 2020-11-06

## 2020-11-06 RX ORDER — GENTAMICIN SULFATE 1 MG/G
OINTMENT TOPICAL ONCE
Status: CANCELLED | OUTPATIENT
Start: 2020-11-06

## 2020-11-06 RX ORDER — BACITRACIN ZINC AND POLYMYXIN B SULFATE 500; 1000 [USP'U]/G; [USP'U]/G
OINTMENT TOPICAL ONCE
Status: CANCELLED | OUTPATIENT
Start: 2020-11-06

## 2020-11-06 RX ORDER — LIDOCAINE 50 MG/G
OINTMENT TOPICAL ONCE
Status: CANCELLED | OUTPATIENT
Start: 2020-11-06

## 2020-11-06 RX ORDER — LIDOCAINE HYDROCHLORIDE 20 MG/ML
JELLY TOPICAL ONCE
Status: CANCELLED | OUTPATIENT
Start: 2020-11-06

## 2020-11-06 RX ORDER — LIDOCAINE HYDROCHLORIDE 40 MG/ML
SOLUTION TOPICAL ONCE
Status: CANCELLED | OUTPATIENT
Start: 2020-11-06

## 2020-11-06 RX ORDER — BACITRACIN, NEOMYCIN, POLYMYXIN B 400; 3.5; 5 [USP'U]/G; MG/G; [USP'U]/G
OINTMENT TOPICAL ONCE
Status: CANCELLED | OUTPATIENT
Start: 2020-11-06

## 2020-11-06 NOTE — PROGRESS NOTES
[address] [place] [address] and Procedure Note      [facility]  MEDICAL RECORD NUMBER:  [id number]  AGE: 66 y.o. GENDER: female  : 1945  EPISODE DATE:  2020    SUBJECTIVE:     Chief Complaint   Patient presents with    Wound Check     Scalp         HISTORY OF PRESENT ILLNESS      Walter Smith is a 66 y.o. female who presents today for re-evaluation of left temporal wound. 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  She also received 2 rounds of radiation to this area.  Wound began late May.  Per Luis Rubio's report biopsy showed recurrence of cancerous tissue. Current wound care includes silver alginate and silicone bordered foam.  Reports ongoing moderate amounts of serosanguinous drainage. Has been changing alginate daily.  She denies any fever, chills. Denies pain to wound. She continues on chemotherapy per Dr. Geeta Razo not complete procedure to left eye as proposed by Dr. Marilee Handley due to concerns for non-healing post procedure.   No further concerns identified. PAST MEDICAL HISTORY             Diagnosis Date    Chronic bronchitis (HCC)     Chronic lymphocytic leukemia (HCC)     CLL (chronic lymphocytic leukemia) (HCC)     Essential hypertension     GERD (gastroesophageal reflux disease)     Pneumonia     Squamous cell carcinoma of face     Thyroid disease     Vein, varicose        PAST SURGICAL HISTORY     Past Surgical History:   Procedure Laterality Date    CATARACT REMOVAL      COLONOSCOPY      x3    EYE LID SURGERY      EYE SURGERY      Eye lid.     EYE SURGERY Bilateral 2016    Cataracts    HAND SURGERY      burnt off mole on hand for precancerous    MANDIBLE SURGERY      burnt off mole for precancerous     MOHS SURGERY Left 2017    repair SCC of the left lateral brow    MOHS SURGERY  10/05/2017    Nose    MOHS SURGERY Right 10/06/2017    MOHS Defect Repair BCC Right Distal Nose chemotherapy               Pain:None              NGXYJ margins intact, appears improved as compared to last visit.  No signs of infection.            Wound 07/02/20 Face Left;Upper 1 (Active)   Wound Image   11/06/20 0841   Wound Etiology Other 08/13/20 0947   Dressing Status Intact 11/06/20 0841   Wound Cleansed Irrigated with saline 11/06/20 0841   Dressing/Treatment Alginate with Ag;Silicone border 78/89/39 0816   Offloading for Diabetic Foot Ulcers No offloading required 10/09/20 0915   Wound Length (cm) 2.8 cm 11/06/20 0841   Wound Width (cm) 3.5 cm 11/06/20 0841   Wound Depth (cm) 0.2 cm 11/06/20 0841   Wound Surface Area (cm^2) 9.8 cm^2 11/06/20 0841   Change in Wound Size % (l*w) -68.97 11/06/20 0841   Wound Volume (cm^3) 1.96 cm^3 11/06/20 0841   Wound Healing % -13 11/06/20 0841   Wound Assessment Bleeding;Granulation tissue 11/06/20 0841   Drainage Amount Moderate 11/06/20 0841   Drainage Description Serosanguinous 11/06/20 0841   Odor None 11/06/20 0841   Desiree-wound Assessment Intact; Blanchable erythema 11/06/20 0841   Margins Attached edges 11/06/20 0841   Wound Thickness Description not for Pressure Injury Full thickness 11/06/20 0841   Number of days: 126         LABS     Micro: No results found for: BC    ASSESSMENT     -Non-healing ulcer complicated by hx radiation, Moh's procedure  and squamous cell carncinoma    Depth of Diabetic/Pressure/Non Pressure Ulcers or Wound:  Wound, full thickness     Patient Active Problem List   Diagnosis Code    CLL (chronic lymphocytic leukemia) ([facility]) C91.10    Hypertrophy of nasal turbinates J34.3    Post-nasal drainage R09.82    Chronic rhinitis J31.0    Deviated nasal septum J34.2    Maxillary antritis J32.0    Ethmoid sinusitis J32.2    GERD (gastroesophageal reflux disease) K21.9    Acute sinusitis J01.90    Immune deficiency disorder ([facility]) D84.9    Skin ulcer due to radiation exposure ([facility] [address] L98.499    Personal history of squamous cell carcinoma of skin Z85.828    History of Mohs surgery for squamous cell carcinoma of skin Z98.890, Z85.828       PLAN     Patient examined and evaluated    -Non-healing ulcer left temple- wound bed is filling in nicely, good granulation tissue and epithelialization to medial portion of wound. New induration to distal portion of wound, concern that this may lead to new openings. Monitor closely. Wound will be very difficult/slow to heal due to ongoing chemotherapy, active cancer, and radiation history.    -Continue wound care as follows:  Left scalp- Apply silver alginate to wound bed. Cover with silicone bordered foam. Change every 3 days.    -Okay to wash hair on dressing change days. Leave foam on while showering. Then change dressing after shower. Chitra Feliciano is leaving for [place] next week, will return to [place] in April. Plans to establish care with wound care center in [place]. Follow up to be scheduled at this clinic after return in April. Antibiotics: No, no signs or symptoms of infection at this time.  Signs and symptoms of infectious process reviewed with Abigail Rubio.   Advised her to call clinic or seek emergency care should these occur.       All questions and concerns addressed prior to discharge from today's visit. Please see attached Discharge Instructions    Written patient dismissal instructions given to patient and signed by patient or POA. Discharge Instructions         Discharge Instructions       Visit Discharge/Physician Orders  - Supplies ordered for you through [facility] Call [phone] to reorder.  - Okay to wash hair on dressing change days. Leave foam on while showering. Then change dressing after shower. - Keep a close eye on the area of firmness near the wound.   - Let us know if you need a referral to a wound care center in [place].     Wound Location: Left scalp      Dressing orders:      1) Gather wound care supplies and arrange on clean table.      2) Wash your hands with soap and water or use alcohol based hand  for 20 seconds (sing \"Happy Birthday\" twice).    3) Cleanse wounds with normal saline or wound cleanser and gauze. Pat dry with clean gauze.    4) Left scalp- Apply silver alginate to wound. Cover with silicone bordered foam. Change every 3 days.     Keep all dressings clean & dry.     Do not shower, take baths or get wound wet, unless otherwise instructed by your Wound Care doctor.      Follow up visit: Call for an appointment when you return from [address] [place] [address] next scheduled appointment. Please give 24 hour notice if unable to keep appointment. [phone]     If you experience any of the following, please call the Wound Care Service during business hours: Monday through Friday 8:00 am - 4:30 pm  ([phone]).               *Increase in pain              *Temperature over 101              *Increase in drainage from your wound or a foul odor              *Uncontrolled swelling              *Need for compression bandage changes due to slippage, breakthrough drainage     If you need medical attention outside of business hours, please contact your Primary Care Doctor or go to the nearest emergency room        Electronically signed by SCOOTER Amin CNP on 11/6/2020 at 8:53 AM

## 2020-11-06 NOTE — PLAN OF CARE
Problem: Wound:  Goal: Will show signs of wound healing; wound closure and no evidence of infection  Description: Will show signs of wound healing; wound closure and no evidence of infection  Outcome: Ongoing   Patient presents to wound clinic for follow up of left scalp wound. No s/s of infection. Patient afebrile. Instructed patient to let wound clinic know if she needs a referral to a wound care center in [place]. Left scalp- Apply silver alginate to wound. Cover with silicone bordered foam. Change every 3 days. Patient to call to schedule a follow up appointment when she returns from [place]. Care plan reviewed with patient. Patient verbalize understanding of the plan of care and contribute to goal setting.

## 2020-12-18 ENCOUNTER — TELEPHONE (OUTPATIENT)
Dept: WOUND CARE | Age: 75
End: 2020-12-18

## 2021-02-11 ENCOUNTER — TELEPHONE (OUTPATIENT)
Dept: WOUND CARE | Age: 76
End: 2021-02-11

## 2022-10-05 ENCOUNTER — HOSPITAL ENCOUNTER (OUTPATIENT)
Age: 77
Discharge: HOME | End: 2022-10-05
Payer: MEDICARE

## 2022-10-05 LAB
EKG ATRIAL RATE: 56 BPM
EKG P AXIS: 71 DEGREES
EKG P-R INTERVAL: 204 MS
EKG Q-T INTERVAL: 444 MS
EKG QRS DURATION: 90 MS
EKG QTC CALCULATION (BAZETT): 428 MS
EKG R AXIS: 32 DEGREES
EKG T AXIS: 39 DEGREES
EKG VENTRICULAR RATE: 56 BPM

## 2022-10-05 PROCEDURE — 93010 ELECTROCARDIOGRAM REPORT: CPT | Performed by: INTERNAL MEDICINE

## 2022-10-05 PROCEDURE — 93005 ELECTROCARDIOGRAM TRACING: CPT | Performed by: PHYSICIAN ASSISTANT

## 2022-10-14 NOTE — PROGRESS NOTES
PAT call attempted, patient unavailable, left message to please call us back at your earliest convenience; [phone]

## 2022-10-18 RX ORDER — DOCUSATE SODIUM 100 MG/1
100 CAPSULE, LIQUID FILLED ORAL DAILY
COMMUNITY

## 2022-10-18 RX ORDER — FUROSEMIDE 40 MG/1
40 TABLET ORAL DAILY
COMMUNITY

## 2022-10-18 RX ORDER — ACETAMINOPHEN 500 MG
TABLET ORAL DAILY
COMMUNITY

## 2022-10-18 RX ORDER — LORATADINE 10 MG/1
10 TABLET ORAL DAILY
COMMUNITY

## 2022-10-18 NOTE — PROGRESS NOTES
NPO after midnight  [employer] and drivers license  Wear comfortable clean clothing  Do not bring jewelry  Shower night before and morning of surgery with a liquid antibacterial soap  Bring list of medications with dosage and how often taken  Follow all instructions given by your physician   needed at discharge  Please limit to 2 visitors for surgery  You must have a responsible adult with you day of surgery and for 24 hours after surgery  Call [phone] for any questions

## 2022-10-18 NOTE — PROGRESS NOTES
In preparation for their surgical procedure above patient was screened for Obstructive Sleep Apnea (ADRIENNE) using the STOP-Bang Questionnaire by the Pre-Admission Testing department. This is a pre-surgical screening tool for patient safety and serves as a recommendation, this WILL NOT cause cancellation of surgery. STOP-Bang Questionnaire  * Do you currently see a pulmonologist?  No     If yes STOP, do not complete. Patient follows with Dr.     1.  Do you snore loudly (able to be heard in the next room)? No    2. Do you often feel tired or sleepy during the daytime? No       3. Has anyone ever told you that you stop breathing during your sleep? No    4. Do you have or are you being treated for high blood pressure? Yes      5. BMI more than 35? BMI (Calculated): 25.8        No    6. Age over 48 years? 76 y.o. Yes    7. Neck Circumference greater than 17 inches for male or 16 inches for female? Measured           (visits only)            Not Applicable    8. Gender Male? No      TOTAL SCORE: 2    ADRIENNE - Low Risk : Yes to 0 - 2 questions  ADRIENNE - Intermediate Risk : Yes to 3 - 4 questions  ADRIENNE - High Risk : Yes to 5 - 8 questions    Adapted from:   STOP Questionnaire: A Tool to Screen Patients for Obstructive Sleep Apnea   [employer]AN[employer] Last[employer]P.C., William Eldridge M.B.B.S., Chevy Madrigal M.D., Stephanie Brunson, Ph.D., VIOLETTE Clemente.B.B.S., Guille Mcneill M.Sc., Ned Mcknight M.D., Zhen Mello. BEVERLY MarcelnioP.C[employer]    Anesthesiology 2008; 850:239-18 Copyright 2008, the [address] of [employer]est[employer], [facility] 37.   ----------------------------------------------------------------------------------------------------------------

## 2022-10-21 ENCOUNTER — ANESTHESIA (OUTPATIENT)
Dept: OPERATING ROOM | Age: 77
End: 2022-10-21
Payer: MEDICARE

## 2022-10-21 ENCOUNTER — ANESTHESIA EVENT (OUTPATIENT)
Dept: OPERATING ROOM | Age: 77
End: 2022-10-21
Payer: MEDICARE

## 2022-10-21 ENCOUNTER — HOSPITAL ENCOUNTER (OUTPATIENT)
Age: 77
Setting detail: OUTPATIENT SURGERY
Discharge: HOME | End: 2022-10-21
Attending: SPECIALIST | Admitting: SPECIALIST
Payer: MEDICARE

## 2022-10-21 VITALS
HEIGHT: 64 IN | HEART RATE: 60 BPM | DIASTOLIC BLOOD PRESSURE: 59 MMHG | WEIGHT: 150.8 LBS | SYSTOLIC BLOOD PRESSURE: 135 MMHG | OXYGEN SATURATION: 96 % | RESPIRATION RATE: 16 BRPM | BODY MASS INDEX: 25.74 KG/M2 | TEMPERATURE: 96.9 F

## 2022-10-21 DIAGNOSIS — C44.311 BASAL CELL CARCINOMA OF NOSE: Primary | ICD-10-CM

## 2022-10-21 PROCEDURE — 2500000003 HC RX 250 WO HCPCS: Performed by: NURSE ANESTHETIST, CERTIFIED REGISTERED

## 2022-10-21 PROCEDURE — 3600000012 HC SURGERY LEVEL 2 ADDTL 15MIN: Performed by: SPECIALIST

## 2022-10-21 PROCEDURE — 2500000003 HC RX 250 WO HCPCS: Performed by: SPECIALIST

## 2022-10-21 PROCEDURE — 3700000001 HC ADD 15 MINUTES (ANESTHESIA): Performed by: SPECIALIST

## 2022-10-21 PROCEDURE — 6370000000 HC RX 637 (ALT 250 FOR IP): Performed by: SPECIALIST

## 2022-10-21 PROCEDURE — 2580000003 HC RX 258: Performed by: SPECIALIST

## 2022-10-21 PROCEDURE — 7100000010 HC PHASE II RECOVERY - FIRST 15 MIN: Performed by: SPECIALIST

## 2022-10-21 PROCEDURE — 7100000011 HC PHASE II RECOVERY - ADDTL 15 MIN: Performed by: SPECIALIST

## 2022-10-21 PROCEDURE — 6360000002 HC RX W HCPCS: Performed by: SPECIALIST

## 2022-10-21 PROCEDURE — 3600000002 HC SURGERY LEVEL 2 BASE: Performed by: SPECIALIST

## 2022-10-21 PROCEDURE — 2709999900 HC NON-CHARGEABLE SUPPLY: Performed by: SPECIALIST

## 2022-10-21 PROCEDURE — 3700000000 HC ANESTHESIA ATTENDED CARE: Performed by: SPECIALIST

## 2022-10-21 RX ORDER — LIDOCAINE HYDROCHLORIDE AND EPINEPHRINE 20; 5 MG/ML; UG/ML
INJECTION, SOLUTION EPIDURAL; INFILTRATION; INTRACAUDAL; PERINEURAL PRN
Status: DISCONTINUED | OUTPATIENT
Start: 2022-10-21 | End: 2022-10-21 | Stop reason: ALTCHOICE

## 2022-10-21 RX ORDER — GINSENG 100 MG
CAPSULE ORAL PRN
Status: DISCONTINUED | OUTPATIENT
Start: 2022-10-21 | End: 2022-10-21 | Stop reason: ALTCHOICE

## 2022-10-21 RX ORDER — LIDOCAINE HYDROCHLORIDE 20 MG/ML
INJECTION, SOLUTION EPIDURAL; INFILTRATION; INTRACAUDAL; PERINEURAL PRN
Status: DISCONTINUED | OUTPATIENT
Start: 2022-10-21 | End: 2022-10-21 | Stop reason: SDUPTHER

## 2022-10-21 RX ORDER — SODIUM CHLORIDE 9 MG/ML
INJECTION, SOLUTION INTRAVENOUS CONTINUOUS
Status: DISCONTINUED | OUTPATIENT
Start: 2022-10-21 | End: 2022-10-21 | Stop reason: HOSPADM

## 2022-10-21 RX ORDER — HYDROCODONE BITARTRATE AND ACETAMINOPHEN 5; 325 MG/1; MG/1
1 TABLET ORAL EVERY 6 HOURS PRN
Qty: 10 TABLET | Refills: 0 | Status: SHIPPED | OUTPATIENT
Start: 2022-10-21 | End: 2022-10-24

## 2022-10-21 RX ORDER — PROPOFOL 10 MG/ML
INJECTION, EMULSION INTRAVENOUS PRN
Status: DISCONTINUED | OUTPATIENT
Start: 2022-10-21 | End: 2022-10-21 | Stop reason: SDUPTHER

## 2022-10-21 RX ADMIN — SODIUM CHLORIDE: 9 INJECTION, SOLUTION INTRAVENOUS at 09:07

## 2022-10-21 RX ADMIN — PROPOFOL 50 MCG/KG/MIN: 10 INJECTION, EMULSION INTRAVENOUS at 09:07

## 2022-10-21 RX ADMIN — CEFAZOLIN 2000 MG: 10 INJECTION, POWDER, FOR SOLUTION INTRAVENOUS at 09:20

## 2022-10-21 RX ADMIN — LIDOCAINE HYDROCHLORIDE 3 ML: 20 INJECTION, SOLUTION EPIDURAL; INFILTRATION; INTRACAUDAL; PERINEURAL at 09:07

## 2022-10-21 ASSESSMENT — PAIN - FUNCTIONAL ASSESSMENT: PAIN_FUNCTIONAL_ASSESSMENT: 0-10

## 2022-10-21 NOTE — OP NOTE
Operative Note    Patient name: Twan Oviedo Record Number: [id number]    Primary Care Physician: Chani Ling MD     1945    Date of Procedure: 10/21/2022    Pre-operative Diagnosis: 2cm2 defect of right alar groove s/p MOHS for basal cell carcinoma    Post-operative Diagnosis: Same    Procedure Performed: Repair of 2cm2 right alar groove defect with an adjacent tissue transfer (10 cm2) (CPT 14020)    Surgeons/Assistants: MD Yolanda Garcia PA-C    Estimated Blood Loss: 3ml     Complications: none immediately appreciated    Procedure: With the patient lying in the supine position and under adequate anesthesia per the anesthesia team, the area was anesthetized with a total of 11 ml of 1% Lidocaine 1:100,000 with epinephrine solution. The area was then prepped and draped in the standard surgical fashion. There was a 2cm2 complex defect which extended onto the right ala, which could not be closed primarily due to distortion of nose. Therefore, a 10cm2 (4cm x 2cm + 2cm2) sum of defect/adjacent tissue transfer rotation flap was then designed as inferiorlaterally based rotation flap, back cut 4cm onto the right nasolabial fold, elevated 2cm, and inset with 4-0 Monocryl suture placed in interrupted buried fashion. The Burow's triangles were resected with final closure being benzoin/steristrips. The patient tolerated the procedure quite well and remained hemodynamically stable throughout the procedure and was quite comfortable throughout the operative course. Clinical staging for cancer cases:  Roma Miller MD  Electronically signed by me on 10/21/2022 at 9:38 AM Operative Note      Patient:  Rajan Peñaloza  YOB: 1945  MRN: [id number]    Date of Procedure: 10/21/2022    Pre-Op Diagnosis: Basal cell carcinoma of right side of nose [C44.311]    Post-Op Diagnosis: Same       Procedure(s):  MOHS DEFECT REPAIR BCC RIGHT ALAR GROOVE    Surgeon(s):  Kristen Diana MD    Assistant:   Physician Assistant: Chip Moraes PA-C    Anesthesia: Monitor Anesthesia Care    Estimated Blood Loss (mL): Minimal    Complications: None    Specimens:   * No specimens in log *    Implants:  * No implants in log *      Drains: * No LDAs found *    Findings: 2cm2 defect of right alar groove s/p MOHS for basal cell carcinoma    Detailed Description of Procedure:   Repair of 2cm2 right alar groove defect with an adjacent tissue transfer (10 cm2) (CPT 22388)      Electronically signed by Kristen Diana MD on 10/21/2022 at 9:38 AM

## 2022-10-21 NOTE — ANESTHESIA PRE PROCEDURE
Allergies: Allergies   Allergen Reactions    Neosporin [Bacitracin-Polymyxin B]      redness    Seasonal        Problem List:    Patient Active Problem List   Diagnosis Code    CLL (chronic lymphocytic leukemia) ([facility]) C91.10    Hypertrophy of nasal turbinates J34.3    Post-nasal drainage R09.82    Chronic rhinitis J31.0    Deviated nasal septum J34.2    Maxillary antritis J32.0    Ethmoid sinusitis J32.2    GERD (gastroesophageal reflux disease) K21.9    Acute sinusitis J01.90    Immune deficiency disorder ([facility]) D84.9    Skin ulcer due to radiation exposure ([facility] [address] L98.499    Personal history of squamous cell carcinoma of skin Z85.828    History of Mohs surgery for squamous cell carcinoma of skin Z98.890, Z85.828       Past Medical History:        Diagnosis Date    Chronic bronchitis (HCC)     CLL (chronic lymphocytic leukemia) ([facility] [address]     Essential hypertension     GERD (gastroesophageal reflux disease)     Pneumonia     Squamous cell carcinoma of face     Thyroid disease     Vein, varicose        Past Surgical History:        Procedure Laterality Date    CATARACT REMOVAL Bilateral 07/2016    COLONOSCOPY      x3    EYE LID SURGERY      EYE SURGERY      Eye lid.     HAND SURGERY      burnt off mole on hand for precancerous    MANDIBLE SURGERY      burnt off mole for precancerous     MOHS SURGERY Left 07/28/2017    repair SCC of the left lateral brow    MOHS SURGERY  10/05/2017    Nose    MOHS SURGERY Right 10/06/2017    MOHS Defect Repair BCC Right Distal Nose     OH DELAY/SECTN FLAP LID,NOS,EAR,LIP Left 07/28/2017    MOHS REPAIR SCC OF THE LEFT LATERAL BROW performed by Jannie Doan MD at [address] Right 10/06/2017    MOHS DEFECT REPAIR [facility] RIGHT DISTAL NOSE performed by Jannie Doan MD at [facility]  04/20/2015    face       Social History:    Social History     Tobacco Use    Smoking status: Never    Smokeless tobacco: Never   Substance Use Topics    Alcohol use: Not Currently                                Counseling given: Not Answered      Vital Signs (Current):   Vitals:    10/18/22 0952 10/21/22 0754   BP:  137/63   Pulse:  57   Resp:  16   Temp:  97.6 °F (36.4 °C)   TempSrc:  Temporal   SpO2:  97%   Weight: 150 lb (68 kg) 150 lb 12.8 oz (68.4 kg)   Height: 5' 4\" (1.626 m) 5' 4\" (1.626 m)                                              BP Readings from Last 3 Encounters:   10/21/22 137/63   11/06/20 136/65   10/09/20 135/60       NPO Status: Time of last liquid consumption: 0640                        Time of last solid consumption: 2200                        Date of last liquid consumption: 10/21/22                        Date of last solid food consumption: 10/20/22    BMI:   Wt Readings from Last 3 Encounters:   10/21/22 150 lb 12.8 oz (68.4 kg)   11/06/20 140 lb (63.5 kg)   07/02/20 140 lb (63.5 kg)     Body mass index is 25.88 kg/m². CBC:   Lab Results   Component Value Date/Time    WBC 72.6 05/17/2019 08:51 AM    RBC 4.32 05/17/2019 08:51 AM    HGB 12.6 05/17/2019 08:51 AM    HCT 39.7 05/17/2019 08:51 AM    MCV 91.9 05/17/2019 08:51 AM    RDW 15.6 05/17/2019 08:51 AM     05/17/2019 08:51 AM       CMP:   Lab Results   Component Value Date/Time     05/17/2019 08:51 AM    K 4.4 05/17/2019 08:51 AM     05/17/2019 08:51 AM    CO2 24 05/17/2019 08:51 AM    BUN 15 05/17/2019 08:51 AM    CREATININE 0.9 06/03/2020 08:43 AM    LABGLOM 82 07/15/2017 07:24 AM    GLUCOSE 99 05/17/2019 08:51 AM    CALCIUM 9.0 05/17/2019 08:51 AM       POC Tests: No results for input(s): POCGLU, POCNA, POCK, POCCL, POCBUN, POCHEMO, POCHCT in the last 72 hours.     Coags:   Lab Results   Component Value Date/Time    PROTIME 11.2 06/21/2019 07:36 AM    INR 0.97 06/21/2019 07:36 AM       HCG (If Applicable): No results found for: PREGTESTUR, PREGSERUM, HCG, HCGQUANT     ABGs: No results found for: PHART, PO2ART, CZL0LGJ, XYC1ZMH, BEART, A6VDLKMB     Type & Screen (If Applicable):  No results found for: LABABO, LABRH    Drug/Infectious Status (If Applicable):  No results found for: HIV, HEPCAB    COVID-19 Screening (If Applicable): No results found for: COVID19        Anesthesia Evaluation  Patient summary reviewed and Nursing notes reviewed no history of anesthetic complications:   Airway: Mallampati: II  TM distance: >3 FB   Neck ROM: full  Mouth opening: > = 3 FB   Dental:          Pulmonary:normal exam  breath sounds clear to auscultation  (+) pneumonia: resolved,                             Cardiovascular:  Exercise tolerance: good (>4 METS),   (+) hypertension:,                   Neuro/Psych:   Negative Neuro/Psych ROS              GI/Hepatic/Renal:   (+) GERD:,           Endo/Other:    (+) malignancy/cancer. Pt had no PAT visit       Abdominal:             Vascular: negative vascular ROS. Other Findings:           Anesthesia Plan      MAC     ASA 3       Induction: intravenous. Anesthetic plan and risks discussed with patient. Plan discussed with CRNA.                     [address],    10/21/2022

## 2022-10-21 NOTE — ANESTHESIA POSTPROCEDURE EVALUATION
Department of Anesthesiology  Postprocedure Note    Patient: Krista Farrell  MRN: [id number]  YOB: 1945  Date of evaluation: 10/21/2022      Procedure Summary     Date: 10/21/22 Room / Location: [address] [place] [address] 04 / [address] [facility]    Anesthesia Start: 5164 Anesthesia Stop: 5172    Procedure: MOHS DEFECT REPAIR [address] RIGHT ALAR GROOVE (Right: Face) Diagnosis:       Basal cell carcinoma of right side of nose      (Basal cell carcinoma of right side of nose [C44.311])    Surgeons: Herve Gupta MD Responsible Provider: Henrique Gonsalez DO    Anesthesia Type: MAC ASA Status: 3          Anesthesia Type: No value filed.     Naida Phase I:      Naida Phase II: Naida Score: 9      Anesthesia Post Evaluation    Patient location during evaluation: bedside  Patient participation: complete - patient participated  Level of consciousness: awake and alert  Pain score: 0  Airway patency: patent  Nausea & Vomiting: no nausea and no vomiting  Complications: no  Cardiovascular status: hemodynamically stable and blood pressure returned to baseline  Respiratory status: spontaneous ventilation, room air and acceptable  Hydration status: stable

## 2022-10-21 NOTE — PROGRESS NOTES
0950-Patient to Phase II via chair. Report received from [employer]. Patient awake and alert. Vitals obtained and stable. Respirations even and unlabored on room air. Patient denies pain and nausea. Steri-strips in place on right cheek. No drainage noted. Patient denies needs.  in Room. 0955-Patient provided with snack and drink. Denies call light in reach  1020-IV removed with no complications. Discharge instructions reviewed. Verbalized understanding. Patient getting dressed at bedside. Dr. Shona Roper to room to update family. 1037-Patient meets discharge criteria. Discharged in stable condition with responsible . All belongings given to patient. Patient ambulated to car with assistance from RN. Patient tolerated well.

## 2022-10-21 NOTE — H&P
[address] [address]  History and Physical Update    Pt Name: Neeraj Coon  MRN: [id number]  YOB: 1945  Date of evaluation: 10/21/2022    I have examined the patient and reviewed the H&P/Consult and there are no changes to the patient or plans.       Ron Cavazos MD  Electronically signed 10/21/2022 at 8:57 AM

## (undated) DEVICE — GOWN,SIRUS,NON REINFRCD,LARGE,SET IN SL: Brand: MEDLINE

## (undated) DEVICE — SPONGE GZ W4XL4IN COT 12 PLY TYP VII WVN C FLD DSGN

## (undated) DEVICE — GAUZE,SPONGE,4"X4",12PLY,STERILE,LF,2'S: Brand: MEDLINE

## (undated) DEVICE — COTTON BALL ST

## (undated) DEVICE — SUTURE MCRYL SZ 4-0 L18IN ABSRB UD P-3 L13MM 3/8 CIR PRIM Y494G

## (undated) DEVICE — GLOVE ORANGE PI 7   MSG9070

## (undated) DEVICE — PACK PROCEDURE SURG PLAS SC MIN SRHP LF

## (undated) DEVICE — SOLUTION IV 1000ML 0.9% SOD CHL PH 5 INJ USP VIAFLX PLAS

## (undated) DEVICE — GLOVE SURG SZ 8 L11.77IN FNGR THK9.8MIL STRW LTX POLYMER

## (undated) DEVICE — BANDAGE,GAUZE,4.5"X4.1YD,STERILE,LF: Brand: MEDLINE